# Patient Record
Sex: FEMALE | Race: WHITE | NOT HISPANIC OR LATINO | Employment: FULL TIME | URBAN - METROPOLITAN AREA
[De-identification: names, ages, dates, MRNs, and addresses within clinical notes are randomized per-mention and may not be internally consistent; named-entity substitution may affect disease eponyms.]

---

## 2018-10-16 ENCOUNTER — LAB (OUTPATIENT)
Dept: LAB | Facility: HOSPITAL | Age: 33
End: 2018-10-16

## 2018-10-16 ENCOUNTER — TRANSCRIBE ORDERS (OUTPATIENT)
Dept: LAB | Facility: HOSPITAL | Age: 33
End: 2018-10-16

## 2018-10-16 DIAGNOSIS — Z11.3 SCREENING EXAMINATION FOR VENEREAL DISEASE: Primary | ICD-10-CM

## 2018-10-16 DIAGNOSIS — Z11.3 SCREENING EXAMINATION FOR VENEREAL DISEASE: ICD-10-CM

## 2018-10-16 LAB
HBV SURFACE AG SERPL QL IA: NORMAL
HCV AB SER DONR QL: NORMAL
HIV1+2 AB SER QL: NORMAL

## 2018-10-16 PROCEDURE — 36415 COLL VENOUS BLD VENIPUNCTURE: CPT

## 2018-10-16 PROCEDURE — 86592 SYPHILIS TEST NON-TREP QUAL: CPT

## 2018-10-16 PROCEDURE — G0432 EIA HIV-1/HIV-2 SCREEN: HCPCS | Performed by: PHYSICIAN ASSISTANT

## 2018-10-16 PROCEDURE — 87340 HEPATITIS B SURFACE AG IA: CPT

## 2018-10-16 PROCEDURE — 86803 HEPATITIS C AB TEST: CPT | Performed by: PHYSICIAN ASSISTANT

## 2018-10-17 LAB — RPR SER QL: NORMAL

## 2018-11-14 ENCOUNTER — TRANSCRIBE ORDERS (OUTPATIENT)
Dept: ADMINISTRATIVE | Facility: HOSPITAL | Age: 33
End: 2018-11-14

## 2018-11-14 DIAGNOSIS — E04.1 THYROID NODULE: Primary | ICD-10-CM

## 2018-11-26 ENCOUNTER — HOSPITAL ENCOUNTER (OUTPATIENT)
Dept: ULTRASOUND IMAGING | Facility: HOSPITAL | Age: 33
Discharge: HOME | End: 2018-11-26
Attending: FAMILY MEDICINE | Admitting: FAMILY MEDICINE

## 2018-11-26 DIAGNOSIS — E04.1 THYROID NODULE: ICD-10-CM

## 2018-11-26 PROCEDURE — 76536 US EXAM OF HEAD AND NECK: CPT

## 2018-11-29 ENCOUNTER — TRANSCRIBE ORDERS (OUTPATIENT)
Dept: ADMINISTRATIVE | Facility: HOSPITAL | Age: 33
End: 2018-11-29

## 2018-11-29 ENCOUNTER — HOSPITAL ENCOUNTER (EMERGENCY)
Facility: HOSPITAL | Age: 33
End: 2018-11-29

## 2018-11-29 DIAGNOSIS — E04.1 SOLITARY THYROID NODULE: Primary | ICD-10-CM

## 2018-12-14 ENCOUNTER — HOSPITAL ENCOUNTER (OUTPATIENT)
Dept: ULTRASOUND IMAGING | Facility: HOSPITAL | Age: 33
Discharge: HOME | End: 2018-12-14
Attending: FAMILY MEDICINE | Admitting: RADIOLOGY

## 2018-12-14 DIAGNOSIS — E04.1 SOLITARY THYROID NODULE: ICD-10-CM

## 2018-12-14 PROCEDURE — 76942 ECHO GUIDE FOR BIOPSY: CPT

## 2018-12-14 PROCEDURE — 88173 CYTOPATH EVAL FNA REPORT: CPT | Performed by: FAMILY MEDICINE

## 2018-12-14 PROCEDURE — 88305 TISSUE EXAM BY PATHOLOGIST: CPT | Performed by: FAMILY MEDICINE

## 2018-12-14 RX ORDER — LIDOCAINE HYDROCHLORIDE 10 MG/ML
10 INJECTION, SOLUTION EPIDURAL; INFILTRATION; INTRACAUDAL; PERINEURAL ONCE
Status: COMPLETED | OUTPATIENT
Start: 2018-12-14 | End: 2018-12-14

## 2018-12-14 RX ADMIN — LIDOCAINE HYDROCHLORIDE 5 ML: 10 INJECTION, SOLUTION EPIDURAL; INFILTRATION; INTRACAUDAL; PERINEURAL at 16:07

## 2018-12-14 NOTE — POST-PROCEDURE NOTE
Radiology Procedure    Pre-procedure: Informed written consent was obtained.  We discussed the procedure and major risks in detail.  Ms. Urrutia agrees to proceed with ultrasound guided FNA of left thyroid nodule.                 Procedure Performed: Ultrasound guided 25 gauge FNA x 3 of left thyroid nodule     IV Sedation and/or Anesthesia:  No    Complications: None.    Preliminary Findings: Dark bloody aspirate x 3 used to make 3 pairs of slides, remainder combined in cytology fixative    Specimen Removed: As above    Estimated Blood Loss:  0ml/sample only    Post-Procedure Diagnosis: Left thyroid nodule.    Post-Procedure Plan: She has no c/o.  She will f/u with Dr. Lobo for results.    Standard Discharge Instructions Given:yes     Royce Perez MD  12/14/18  4:46 PM

## 2018-12-19 LAB
LAB AP CASE REPORT: NORMAL
PATH REPORT.FINAL DX SPEC: NORMAL

## 2019-06-24 ENCOUNTER — TRANSCRIBE ORDERS (OUTPATIENT)
Dept: ADMINISTRATIVE | Facility: HOSPITAL | Age: 34
End: 2019-06-24

## 2019-06-24 DIAGNOSIS — E04.1 THYROID NODULE: Primary | ICD-10-CM

## 2019-06-28 ENCOUNTER — HOSPITAL ENCOUNTER (OUTPATIENT)
Dept: ULTRASOUND IMAGING | Facility: HOSPITAL | Age: 34
Discharge: HOME | End: 2019-06-28
Admitting: FAMILY MEDICINE

## 2019-06-28 DIAGNOSIS — E04.1 THYROID NODULE: ICD-10-CM

## 2019-06-28 PROCEDURE — 76536 US EXAM OF HEAD AND NECK: CPT

## 2020-06-26 ENCOUNTER — TRANSCRIBE ORDERS (OUTPATIENT)
Dept: ADMINISTRATIVE | Facility: HOSPITAL | Age: 35
End: 2020-06-26

## 2020-06-26 DIAGNOSIS — E04.1 LEFT THYROID NODULE: Primary | ICD-10-CM

## 2020-07-02 ENCOUNTER — HOSPITAL ENCOUNTER (OUTPATIENT)
Dept: ULTRASOUND IMAGING | Facility: HOSPITAL | Age: 35
Discharge: HOME | End: 2020-07-02
Admitting: FAMILY MEDICINE

## 2020-07-02 DIAGNOSIS — E04.1 LEFT THYROID NODULE: ICD-10-CM

## 2020-07-02 PROCEDURE — 76536 US EXAM OF HEAD AND NECK: CPT

## 2020-08-17 ENCOUNTER — TRANSCRIBE ORDERS (OUTPATIENT)
Dept: ADMINISTRATIVE | Facility: HOSPITAL | Age: 35
End: 2020-08-17

## 2020-08-17 ENCOUNTER — HOSPITAL ENCOUNTER (OUTPATIENT)
Dept: GENERAL RADIOLOGY | Facility: HOSPITAL | Age: 35
Discharge: HOME | End: 2020-08-17
Admitting: INTERNAL MEDICINE

## 2020-08-17 DIAGNOSIS — M25.561 ACUTE PAIN OF RIGHT KNEE: ICD-10-CM

## 2020-08-17 DIAGNOSIS — M25.561 ACUTE PAIN OF RIGHT KNEE: Primary | ICD-10-CM

## 2020-08-17 PROCEDURE — 73560 X-RAY EXAM OF KNEE 1 OR 2: CPT

## 2021-07-09 ENCOUNTER — TRANSCRIBE ORDERS (OUTPATIENT)
Dept: ADMINISTRATIVE | Facility: HOSPITAL | Age: 36
End: 2021-07-09

## 2021-07-09 DIAGNOSIS — E04.1 LEFT THYROID NODULE: Primary | ICD-10-CM

## 2021-07-21 ENCOUNTER — HOSPITAL ENCOUNTER (OUTPATIENT)
Dept: ULTRASOUND IMAGING | Facility: HOSPITAL | Age: 36
Discharge: HOME | End: 2021-07-21
Admitting: FAMILY MEDICINE

## 2021-07-21 DIAGNOSIS — E04.1 LEFT THYROID NODULE: ICD-10-CM

## 2021-07-21 PROCEDURE — 76536 US EXAM OF HEAD AND NECK: CPT

## 2023-02-06 ENCOUNTER — TRANSCRIBE ORDERS (OUTPATIENT)
Dept: ADMINISTRATIVE | Facility: HOSPITAL | Age: 38
End: 2023-02-06
Payer: COMMERCIAL

## 2023-02-06 DIAGNOSIS — E04.1 LEFT THYROID NODULE: Primary | ICD-10-CM

## 2024-02-12 ENCOUNTER — LAB (OUTPATIENT)
Dept: LAB | Facility: HOSPITAL | Age: 39
End: 2024-02-12
Payer: COMMERCIAL

## 2024-02-12 ENCOUNTER — TRANSCRIBE ORDERS (OUTPATIENT)
Dept: LAB | Facility: HOSPITAL | Age: 39
End: 2024-02-12
Payer: COMMERCIAL

## 2024-02-12 DIAGNOSIS — E28.2 POLYCYSTIC OVARIES: ICD-10-CM

## 2024-02-12 DIAGNOSIS — E28.2 POLYCYSTIC OVARIES: Primary | ICD-10-CM

## 2024-02-12 LAB
FSH SERPL-ACNC: 4.08 MIU/ML
LH SERPL-ACNC: 5.21 MIU/ML
TSH SERPL DL<=0.05 MIU/L-ACNC: 3.14 UIU/ML (ref 0.27–4.2)

## 2024-02-12 PROCEDURE — 84443 ASSAY THYROID STIM HORMONE: CPT

## 2024-02-12 PROCEDURE — 82627 DEHYDROEPIANDROSTERONE: CPT

## 2024-02-12 PROCEDURE — 83498 ASY HYDROXYPROGESTERONE 17-D: CPT

## 2024-02-12 PROCEDURE — 83001 ASSAY OF GONADOTROPIN (FSH): CPT

## 2024-02-12 PROCEDURE — 36415 COLL VENOUS BLD VENIPUNCTURE: CPT

## 2024-02-12 PROCEDURE — 84402 ASSAY OF FREE TESTOSTERONE: CPT

## 2024-02-12 PROCEDURE — 83002 ASSAY OF GONADOTROPIN (LH): CPT

## 2024-02-13 LAB — DHEA-S SERPL-MCNC: 151 UG/DL (ref 57.3–279.2)

## 2024-02-14 ENCOUNTER — TRANSCRIBE ORDERS (OUTPATIENT)
Dept: ADMINISTRATIVE | Facility: HOSPITAL | Age: 39
End: 2024-02-14
Payer: COMMERCIAL

## 2024-02-14 DIAGNOSIS — E04.1 LEFT THYROID NODULE: Primary | ICD-10-CM

## 2024-02-16 LAB — 17OHP SERPL-MCNC: 29 NG/DL

## 2024-02-20 LAB — TESTOST FREE SERPL-MCNC: 0.5 PG/ML (ref 0–4.2)

## 2024-03-04 ENCOUNTER — HOSPITAL ENCOUNTER (OUTPATIENT)
Dept: ULTRASOUND IMAGING | Facility: HOSPITAL | Age: 39
Discharge: HOME | End: 2024-03-04
Admitting: CLINIC/CENTER
Payer: COMMERCIAL

## 2024-03-04 DIAGNOSIS — E04.1 LEFT THYROID NODULE: ICD-10-CM

## 2024-03-04 PROCEDURE — 76536 US EXAM OF HEAD AND NECK: CPT

## 2024-03-21 ENCOUNTER — OFFICE VISIT (OUTPATIENT)
Dept: ENDOCRINOLOGY | Facility: CLINIC | Age: 39
End: 2024-03-21
Payer: COMMERCIAL

## 2024-03-21 VITALS
HEIGHT: 64 IN | HEART RATE: 83 BPM | WEIGHT: 160 LBS | OXYGEN SATURATION: 99 % | SYSTOLIC BLOOD PRESSURE: 114 MMHG | BODY MASS INDEX: 27.31 KG/M2 | DIASTOLIC BLOOD PRESSURE: 74 MMHG

## 2024-03-21 DIAGNOSIS — E04.2 MULTIPLE THYROID NODULES: Primary | ICD-10-CM

## 2024-03-21 PROBLEM — E04.1 THYROID NODULE: Status: ACTIVE | Noted: 2024-03-21

## 2024-03-21 PROCEDURE — 99203 OFFICE O/P NEW LOW 30 MIN: CPT | Performed by: INTERNAL MEDICINE

## 2024-03-21 RX ORDER — CETIRIZINE HYDROCHLORIDE 10 MG/1
10 TABLET ORAL DAILY
COMMUNITY

## 2024-03-21 RX ORDER — MULTIPLE VITAMINS W/ MINERALS TAB 9MG-400MCG
1 TAB ORAL DAILY
COMMUNITY

## 2024-03-21 RX ORDER — PAROXETINE HYDROCHLORIDE 20 MG/1
TABLET, FILM COATED ORAL
COMMUNITY

## 2024-03-21 RX ORDER — DROSPIRENONE 4 MG/1
1 TABLET, FILM COATED ORAL DAILY
COMMUNITY
Start: 2024-03-19 | End: 2024-06-17

## 2024-03-21 NOTE — ASSESSMENT & PLAN NOTE
Continue statin     She has been euthyroid.  She has multiple thyroid nodules.  One in the right lobe was quite small and stable.  The larger left nodule has increased in size due to accumulation of cyst fluid.  Prior FNA of this nodule was benign.  We discussed the low likelihood of malignancy.  We discussed treatment options including observation vs FNA with drainage vs ablation (but I don't know of anyone in the area doing this) vs surgery.    She preferred to do FNA with drainage.  Will schedule this.

## 2024-03-21 NOTE — PROGRESS NOTES
Office Note      Date: 2024  Patient Name: Candace Urrutia  MRN: [id number]  : 1985    Chief Complaint   Patient presents with    Thyroid Problem       History of Present Illness:   Candace Urrutia is a 38 y.o. female who presents for Thyroid Problem    She has h/o thyroid nodules.  She had FNA done of left nodule in 2018 that was benign.  This nodule has accumulated cyst fluid over time.  The nodule was 2.4cm in 2018.  The most recent u/s from earlier this month showed the nodule was 3.4cm and was predominantly cystic.  She had labs done last month.  The TSH was normal at 3.14.  She denies any h/o hypo- or hyperthyroidism.  She hasn't taken any thyroid meds.  She notes some fatigue.  She notes constipation and diarrhea.  She notes cold and heat intolerance.  She notes anxiety and depression.  She notes rare trouble swallowing.  She notes some pressure in her neck when lying supine.  She says the nodule is visible to her now.    Subjective      Patient was born where: [place].  Facial radiation exposure: No.  High iodine intake: No  Family hx of thyroid disease: Yes, describe: mother.    Review of Systems:   Review of Systems   Constitutional:  Positive for activity change, appetite change, fatigue and unexpected weight change.   HENT: Negative.     Eyes: Negative.    Respiratory: Negative.     Cardiovascular: Negative.    Gastrointestinal:  Positive for constipation and diarrhea.   Endocrine: Positive for cold intolerance, heat intolerance and polydipsia.   Genitourinary:  Positive for dysuria, pelvic pain and urgency.   Musculoskeletal: Negative.    Skin: Negative.    Allergic/Immunologic: Positive for environmental allergies.   Neurological: Negative.    Hematological:  Bruises/bleeds easily.   Psychiatric/Behavioral:  Positive for decreased concentration. The patient is nervous/anxious.        The following portions of the patient's history were reviewed and updated as  "appropriate: allergies, current medications, past family history, past medical history, past social history, past surgical history, and problem list.    Objective     Visit Vitals  /74   Pulse 83   Ht 162.6 cm (64\")   Wt 72.6 kg (160 lb)   SpO2 99%   BMI 27.46 kg/m²       Physical Exam:  Physical Exam  Constitutional:       Appearance: Normal appearance.   HENT:      Head: Normocephalic and atraumatic.   Eyes:      Extraocular Movements: Extraocular movements intact.      Conjunctiva/sclera: Conjunctivae normal.   Neck:      Thyroid: Thyroid mass present. No thyromegaly or thyroid tenderness.      Comments: 3cm left thyroid nodule - freely movable  Cardiovascular:      Rate and Rhythm: Normal rate and regular rhythm.      Pulses: Normal pulses.      Heart sounds: Normal heart sounds.   Pulmonary:      Effort: Pulmonary effort is normal.      Breath sounds: Normal breath sounds.   Abdominal:      General: Bowel sounds are normal.      Palpations: Abdomen is soft.   Musculoskeletal:         General: Normal range of motion.      Cervical back: Normal range of motion and neck supple.   Lymphadenopathy:      Cervical: No cervical adenopathy.   Skin:     General: Skin is warm and dry.   Neurological:      Mental Status: She is alert.   Psychiatric:         Mood and Affect: Mood normal.         Behavior: Behavior normal.         Thought Content: Thought content normal.         Judgment: Judgment normal.         Labs:    TSH  No results found for: \"TSHBASE\"     Free T4  No results found for: \"FREET4\"    T3  No results found for: \"C7JTFUM\"      TPO  No results found for: \"THYROIDAB\"    TG AB  No results found for: \"THGAB\"    TG  No results found for: \"THYROGLB\"    CBC w/DIFF  No results found for: \"WBC\", \"RBC\", \"HGB\", \"HCT\", \"MCV\", \"MCH\", \"MCHC\", \"RDW\", \"RDWSD\", \"MPV\", \"PLT\", \"NEUTRORELPCT\", \"LYMPHORELPCT\", \"MONORELPCT\", \"EOSRELPCT\", \"BASORELPCT\", \"AUTOIGPER\", \"NEUTROABS\", \"LYMPHSABS\", \"MONOSABS\", \"EOSABS\", \"BASOSABS\", " "\"AUTOIGNUM\", \"NRBC\"        Assessment / Plan      Assessment & Plan:  Diagnoses and all orders for this visit:    1. Multiple thyroid nodules (Primary)  Assessment & Plan:  She has been euthyroid.  She has multiple thyroid nodules.  One in the right lobe was quite small and stable.  The larger left nodule has increased in size due to accumulation of cyst fluid.  Prior FNA of this nodule was benign.  We discussed the low likelihood of malignancy.  We discussed treatment options including observation vs FNA with drainage vs ablation (but I don't know of anyone in the area doing this) vs surgery.    She preferred to do FNA with drainage.  Will schedule this.             Current Outpatient Medications   Medication Instructions    cetirizine (ZYRTEC) 10 mg, Oral, Daily    Drospirenone (Slynd) 4 MG tablet 1 tablet, Oral, Daily    multivitamin with minerals tablet tablet 1 tablet, Oral, Daily    PARoxetine (PAXIL) 20 MG tablet       Return in about 6 months (around 9/21/2024) for Recheck with TSH.    Electronically signed by: Kit Gutierres MD  03/21/2024  "

## 2024-03-25 ENCOUNTER — OFFICE VISIT (OUTPATIENT)
Dept: ENDOCRINOLOGY | Facility: CLINIC | Age: 39
End: 2024-03-25
Payer: COMMERCIAL

## 2024-03-25 VITALS
DIASTOLIC BLOOD PRESSURE: 68 MMHG | OXYGEN SATURATION: 98 % | BODY MASS INDEX: 26.72 KG/M2 | HEART RATE: 60 BPM | SYSTOLIC BLOOD PRESSURE: 114 MMHG | HEIGHT: 64 IN | WEIGHT: 156.5 LBS

## 2024-03-25 DIAGNOSIS — E04.2 MULTIPLE THYROID NODULES: Primary | ICD-10-CM

## 2024-03-25 PROCEDURE — 10005 FNA BX W/US GDN 1ST LES: CPT | Performed by: INTERNAL MEDICINE

## 2024-03-25 NOTE — PROGRESS NOTES
"     Office Note      Date: 2024  Patient Name: Candace Urrutia  MRN: [id number]  : 1985    Chief Complaint   Patient presents with    Thyroid Problem     Fluid drainage       History of Present Illness:   Candace Urrutia is a 38 y.o. female who presents for Thyroid Problem (Fluid drainage)    She has h/o left thyroid cyst.  This has accumulated more fluid and we discussed FNA with drainage.  She returns for FNA today.    Subjective      Review of Systems:   Review of Systems    The following portions of the patient's history were reviewed and updated as appropriate: allergies, current medications, past family history, past medical history, past social history, past surgical history, and problem list.    Objective     Visit Vitals  /68 (BP Location: Left arm, Patient Position: Sitting, Cuff Size: Adult)   Pulse 60   Ht 162.6 cm (64\")   Wt 71 kg (156 lb 8 oz)   SpO2 98%   BMI 26.86 kg/m²       Physical Exam:  Physical Exam    Labs:    TSH  No results found for: \"TSHBASE\"     Free T4  No results found for: \"FREET4\"    T3  No results found for: \"[id number]\"      TPO  No results found for: \"THYROIDAB\"    TG AB  No results found for: \"THGAB\"    TG  No results found for: \"THYROGLB\"    CBC w/DIFF  No results found for: \"WBC\", \"RBC\", \"HGB\", \"HCT\", \"MCV\", \"MCH\", \"MCHC\", \"RDW\", \"RDWSD\", \"MPV\", \"PLT\", \"NEUTRORELPCT\", \"LYMPHORELPCT\", \"MONORELPCT\", \"EOSRELPCT\", \"BASORELPCT\", \"AUTOIGPER\", \"NEUTROABS\", \"LYMPHSABS\", \"MONOSABS\", \"EOSABS\", \"BASOSABS\", \"AUTOIGNUM\", \"NRBC\"        Assessment / Plan      Assessment & Plan:  Diagnoses and all orders for this visit:    1. Multiple thyroid nodules (Primary)  Assessment & Plan:  She returns for FNA of left thyroid cyst.    Informed consent was obtained.  The neck was prepped with alcohol.  The u/s was used for guidance.  Three passes were made into the left cystic nodule.  Approximately 4mL total of reddish-brown cyst fluid was removed.  She tolerated the " procedure well without obvious complication.  Will contact her with pathology results when available.    Orders:  -     US Fine Needle Aspiration BX 1st Lesion; Future      Current Outpatient Medications   Medication Instructions    cetirizine (ZYRTEC) 10 mg, Oral, Daily    Drospirenone (Slynd) 4 MG tablet 1 tablet, Oral, Daily    multivitamin with minerals tablet tablet 1 tablet, Oral, Daily    PARoxetine (PAXIL) 20 MG tablet       Return for Next scheduled follow up, Recheck with TSH.    Electronically signed by: Kit Gutierres MD  03/25/2024

## 2024-03-25 NOTE — ASSESSMENT & PLAN NOTE
She returns for FNA of left thyroid cyst.    Informed consent was obtained.  The neck was prepped with alcohol.  The u/s was used for guidance.  Three passes were made into the left cystic nodule.  Approximately 4mL total of reddish-brown cyst fluid was removed.  She tolerated the procedure well without obvious complication.  Will contact her with pathology results when available.

## 2024-03-27 LAB — REF LAB TEST METHOD: NORMAL

## 2024-10-04 ENCOUNTER — OFFICE VISIT (OUTPATIENT)
Dept: ENDOCRINOLOGY | Facility: CLINIC | Age: 39
End: 2024-10-04
Payer: COMMERCIAL

## 2024-10-04 VITALS
OXYGEN SATURATION: 99 % | DIASTOLIC BLOOD PRESSURE: 70 MMHG | HEART RATE: 65 BPM | SYSTOLIC BLOOD PRESSURE: 110 MMHG | WEIGHT: 156 LBS | HEIGHT: 64 IN | BODY MASS INDEX: 26.63 KG/M2

## 2024-10-04 DIAGNOSIS — E04.2 MULTIPLE THYROID NODULES: Primary | ICD-10-CM

## 2024-10-04 PROCEDURE — 99213 OFFICE O/P EST LOW 20 MIN: CPT | Performed by: INTERNAL MEDICINE

## 2024-10-04 PROCEDURE — 84443 ASSAY THYROID STIM HORMONE: CPT | Performed by: INTERNAL MEDICINE

## 2024-10-04 PROCEDURE — 36415 COLL VENOUS BLD VENIPUNCTURE: CPT | Performed by: INTERNAL MEDICINE

## 2024-10-04 RX ORDER — DROSPIRENONE 4 MG/1
4 TABLET, FILM COATED ORAL DAILY
COMMUNITY
Start: 2024-01-15

## 2024-10-04 NOTE — PROGRESS NOTES
"     Office Note      Date: 10/04/2024  Patient Name: Candace Urrutia  MRN: [id number]  : 1985    Chief Complaint   Patient presents with    Thyroid Problem     Nodules       History of Present Illness:   Candace Urrutia is a 39 y.o. female who presents for Thyroid Problem (Nodules)    She has h/o thyroid nodules. She had FNA done of left nodule in 2018 that was benign. This nodule has accumulated cyst fluid over time. The nodule was 2.4cm in 2018. The cyst had increased in size and we did FNA with drainage 3/2024. She denies any h/o hypo- or hyperthyroidism. She hasn't taken any thyroid meds. She notes some fatigue. She notes cold and heat intolerance. She notes anxiety and depression. She denies trouble swallowing. She notes that the pressure in her neck when lying supine has resolved after last FNA.     Subjective      Review of Systems:   Review of Systems   Constitutional:  Positive for fatigue.   Cardiovascular: Negative.    Gastrointestinal: Negative.    Endocrine: Positive for cold intolerance and heat intolerance.       The following portions of the patient's history were reviewed and updated as appropriate: allergies, current medications, past family history, past medical history, past social history, past surgical history, and problem list.    Objective     Visit Vitals  /70 (BP Location: Left arm, Patient Position: Sitting, Cuff Size: Adult)   Pulse 65   Ht 162.6 cm (64\")   Wt 70.8 kg (156 lb)   SpO2 99%   BMI 26.78 kg/m²       Physical Exam:  Physical Exam  Constitutional:       Appearance: Normal appearance.   Neck:      Thyroid: Thyroid mass present. No thyromegaly or thyroid tenderness.      Comments: 3cm nodule in left thyroid lobe - freely movable  Lymphadenopathy:      Cervical: No cervical adenopathy.   Neurological:      Mental Status: She is alert.       Labs:    TSH  No results found for: \"TSHBASE\"     Free T4  No results found for: \"FREET4\"    T3  No results " "found for: \"[id number]\"      TPO  No results found for: \"THYROIDAB\"    TG AB  No results found for: \"THGAB\"    TG  No results found for: \"THYROGLB\"    CBC w/DIFF  No results found for: \"WBC\", \"RBC\", \"HGB\", \"HCT\", \"MCV\", \"MCH\", \"MCHC\", \"RDW\", \"RDWSD\", \"MPV\", \"PLT\", \"NEUTRORELPCT\", \"LYMPHORELPCT\", \"MONORELPCT\", \"EOSRELPCT\", \"BASORELPCT\", \"AUTOIGPER\", \"NEUTROABS\", \"LYMPHSABS\", \"MONOSABS\", \"EOSABS\", \"BASOSABS\", \"AUTOIGNUM\", \"NRBC\"        Assessment / Plan      Assessment & Plan:  Diagnoses and all orders for this visit:    1. Multiple thyroid nodules (Primary)  Assessment & Plan:  Symptomatically better after last FNA with cyst aspiration.  Thyroid nodule still palpable.  Check TSH today.        Current Outpatient Medications   Medication Instructions    cetirizine (ZYRTEC) 10 mg, Oral, Daily    multivitamin with minerals tablet tablet 1 tablet, Oral, Daily    PARoxetine (PAXIL) 20 MG tablet     Slynd 4 mg, Oral, Daily      Return in about 6 months (around 4/4/2025) for Recheck with TSH, neck u/s.    Electronically signed by: Kit Gutierres MD  10/04/2024  "

## 2024-10-04 NOTE — ASSESSMENT & PLAN NOTE
Symptomatically better after last FNA with cyst aspiration.  Thyroid nodule still palpable.  Check TSH today.

## 2024-10-05 LAB — TSH SERPL DL<=0.05 MIU/L-ACNC: 2.41 UIU/ML (ref 0.27–4.2)

## 2025-02-04 ENCOUNTER — TELEPHONE (OUTPATIENT)
Dept: ENDOCRINOLOGY | Facility: CLINIC | Age: 40
End: 2025-02-04
Payer: COMMERCIAL

## 2025-02-04 NOTE — TELEPHONE ENCOUNTER
Caller: Candace Urrutia    Relationship: Self    Best call back number:   Telephone Information:   Mobile [phone]         What is the best time to reach you: ANYTIME    Who are you requesting to speak with (clinical staff, provider,  specific staff member): CLINICAL STAFF / PROVIDER     What was the call regarding: PT CALLED STATING SHE IS HAVING TROUBLE SWALLOWING AND IS NEEDING A SOONER APPT. PLEASE ADVISE.

## 2025-02-10 ENCOUNTER — OFFICE VISIT (OUTPATIENT)
Dept: ENDOCRINOLOGY | Facility: CLINIC | Age: 40
End: 2025-02-10
Payer: COMMERCIAL

## 2025-02-10 VITALS
DIASTOLIC BLOOD PRESSURE: 72 MMHG | OXYGEN SATURATION: 95 % | WEIGHT: 162 LBS | HEIGHT: 64 IN | SYSTOLIC BLOOD PRESSURE: 120 MMHG | BODY MASS INDEX: 27.66 KG/M2 | HEART RATE: 79 BPM

## 2025-02-10 DIAGNOSIS — E04.2 NONTOXIC MULTINODULAR GOITER: Primary | ICD-10-CM

## 2025-02-10 PROCEDURE — 99213 OFFICE O/P EST LOW 20 MIN: CPT | Performed by: INTERNAL MEDICINE

## 2025-02-10 PROCEDURE — 76536 US EXAM OF HEAD AND NECK: CPT | Performed by: INTERNAL MEDICINE

## 2025-02-10 NOTE — PROGRESS NOTES
"Chief Complaint   Patient presents with    multinodular goiter     Follow-up       HPI:   Candace Urrutia is a 39 y.o.female who [facility] to [facility] for f/u evaluation of her multinodular goiter. Last clinic visit was in March 25, 2024 with colleague Dr. Kit Gutierres.  Dr. Anaya was not available today for follow-up visit; therefore, patient was scheduled with me. Her history is as follows:    Interim Events:   -Patient presents today concerned that her goiter has increased in size.  Also reports difficulty swallowing, notes discomfort after swallowing her food in the esophageal area.     1) nontoxic multinodular goiter:  - Patient was initially found to have a goiter on physical exam in 2018  She has had serial thyroid Ultrasounds: [facility] Thyroid US (11/2018), (06/2019), (07/2020), (07/2021), (03/2024)  S/P FNA of left nodule in 2018 by radiology that showed benign cytology, S/P FNA of Left nodules by Dr. Gutierres and findings were consistent with thyroid cyst.     FMH: no known thyroid cancer, no known thyroid disease  PMH: no h/o irradiation of head, neck, or chest    Review of Systems   Constitutional: Negative.    HENT:  Positive for trouble swallowing.    Eyes: Negative.    Respiratory: Negative.     Cardiovascular: Negative.    Gastrointestinal: Negative.    Endocrine: Negative.    Genitourinary: Negative.    Musculoskeletal: Negative.    Skin: Negative.    Allergic/Immunologic: Negative.    Neurological: Negative.    Hematological: Negative.    Psychiatric/Behavioral: Negative.       The following portions of the patient's history were reviewed and updated as appropriate: allergies, current medications, past family history, past medical history, past social history, past surgical history and problem list.    /72 (BP Location: Right arm, Patient Position: Sitting, Cuff Size: Adult)   Pulse 79   Ht 162.6 cm (64\")   Wt 73.5 kg (162 lb)   SpO2 95%   BMI 27.81 kg/m²   Physical " Exam  Vitals reviewed.   Constitutional:       General: She is not in acute distress.     Appearance: She is well-developed. She is not diaphoretic.   HENT:      Head: Normocephalic.   Eyes:      Conjunctiva/sclera: Conjunctivae normal.      Pupils: Pupils are equal, round, and reactive to light.   Neck:      Thyroid: Thyromegaly (mild, right lobe normal, left lobe mildly enlarged) present.      Trachea: No tracheal deviation.      Comments: No discreet palpable nodule in the left lobe; however, the left lobe is mildly enlarged.     Cardiovascular:      Rate and Rhythm: Normal rate and regular rhythm.      Heart sounds: Normal heart sounds. No murmur heard.  Pulmonary:      Effort: Pulmonary effort is normal. No respiratory distress.      Breath sounds: Normal breath sounds.   Lymphadenopathy:      Cervical: No cervical adenopathy.   Skin:     General: Skin is warm and dry.      Findings: No erythema.   Neurological:      Mental Status: She is alert and oriented to person, place, and time.      Cranial Nerves: No cranial nerve deficit.   Psychiatric:         Behavior: Behavior normal.       LABS/IMAGING:   Lab Results   Component Value Date    TSH 2.410 10/04/2024       PROCEDURES (in office):  Thyroid Ultrasound Report  [facility]  [place], [place]    Date: 02/10/2025  Indication: non-toxic multinodular goiter  Comparison Imaging:  [facility] Thyroid US (11/2018), (06/2019), (07/2020), (07/2021), (03/2024)  Clinical History: 39 y.o. female with h/o MNG and normal thyroid function. S/P FNA of left nodule in 2018 by radiology that showed benign cytology, S/P FNA of Left nodules by Dr. Gutierres and findings were consistent with thyroid cyst.     Real time high resolution imaging of the thyroid gland was performed in transverse and longitudinal planes.  The right lobe measured 5.85 cm in Length x 1.77 cm in AP diameter x 1.85 cm in TV dimension.  The isthmus measured 0.20 cm in thickness.  The left thyroid lobe  measured 5.40 cm in length x 1.96 cm in AP diameter x 2.26 cm in TV dimension.    The thyroid gland appeared overall isoechoic with mostly homogenous echotexture.     No discrete solid nodules were identified in the right lobe or isthmus.  A subcentimeter (0.25 cm) thyroid cyst was seen in the right mid to superior lobe.    In the left superior lobe, a 0.45 (L) x 0.41 (AP) x 0.46 (T) cm isoechoic, solid nodule was identified.  The nodule had a smooth margin with an anechoic rim consistent with halo sign.  The nodule had no vascularity and no microcalcifications. (TI-RADS level 3).    In the left mid to inferior lobe, a 2.91(L) x 1.88(AP) x 1.95(T) cm isoechoic, mixed cystic and solid nodule was again identified.  The nodule had a smooth margin, minimal peripheral vascularity, and no microcalcifications.  (TI-RADS level 2).    No pathologic lymph nodes were seen.     IMPRESSION:   1) asymmetric, mildly enlarged thyroid gland with the right lobe and isthmus normal in size in the left lobe slightly enlarged.  Is consistent with multinodular goiter.  2) No discrete solid nodules were identified in the right lobe or isthmus.  A subcentimeter (0.25 cm) thyroid cyst was seen in the right mid to superior lobe.  3) In the left superior lobe, a 0.45 (L) x 0.41 (AP) x 0.46 (T) cm isoechoic, solid nodule was identified.  The nodule had a smooth margin with an anechoic rim consistent with halo sign.  The nodule had no vascularity and no microcalcifications. (TI-RADS level 3).  4) In the left mid to inferior lobe, a 2.91(L) x 1.88(AP) x 1.95(T) cm isoechoic, mixed cystic and solid nodule was again identified.  The nodule had a smooth margin, minimal peripheral vascularity, and no microcalcifications.  (TI-RADS level 2).  5) the small, superior left lobe nodule lacked suspicious ultrasound characteristics and did not meet criteria for FNA.  The left inferior nodule lacked suspicious ultrasound characteristics and did not criteria  for repeat FNA.    RECOMMENDATIONS: Repeat Thyroid US recommended if changes in the gland are noted on physical exam or if persistent cervical lymphadenopathy develops.     Electronically Signed: Jacqui Humphries MD      ASSESSMENT/PLAN:  1) nontoxic multinodular goiter: Thyroid US completed today showed -  - asymmetric, mildly enlarged thyroid gland with the right lobe and isthmus normal in size in the left lobe slightly enlarged.  Is consistent with multinodular goiter.  - No discrete solid nodules were identified in the right lobe or isthmus.  A subcentimeter (0.25 cm) thyroid cyst was seen in the right mid to superior lobe.  - In the left superior lobe, a 0.45 (L) x 0.41 (AP) x 0.46 (T) cm isoechoic, solid nodule was identified.  The nodule had a smooth margin with an anechoic rim consistent with halo sign.  The nodule had no vascularity and no microcalcifications. (TI-RADS level 3).  - In the left mid to inferior lobe, a 2.91(L) x 1.88(AP) x 1.95(T) cm isoechoic, mixed cystic and solid nodule was again identified.  The nodule had a smooth margin, minimal peripheral vascularity, and no microcalcifications.  (TI-RADS level 2).  - the small, superior left lobe nodule lacked suspicious ultrasound characteristics and did not meet criteria for FNA.  The left inferior nodule lacked suspicious ultrasound characteristics and did not criteria for repeat FNA.    RECOMMENDATIONS: Repeat Thyroid US recommended if changes in the gland are noted on physical exam or if persistent cervical lymphadenopathy develops.     Reviewed US images with patient. Discussed the clinical significance of thyroid nodules, risk factors for thyroid cancer, indications for FNA. Discussed that the left lobe nodule is similar in appearance to prior imaging. Is S/P FNA in 2018, 03/2024 of the left lobe nodule by other providers that showed no malignant cells which is reassuring.     Discussed that the dysphagia symptoms she is experiencing appear to be  from her thyroid gland.     Pt to RTC with Dr. Gutierres as needed    Electronically Signed: Jacqui Humphries MD

## 2025-02-10 NOTE — PROGRESS NOTES
Thyroid Ultrasound Report  [facility]  [place], [place]    Date: 02/10/2025  Indication: non-toxic multinodular goiter  Comparison Imaging: [facility] Thyroid US (11/2018), (06/2019), (07/2020), (07/2021), (03/2024)  Clinical History: 39 y.o. female with h/o MNG and normal thyroid function. S/P FNA of left nodule in 2018 by radiology that showed benign cytology, S/P FNA of Left nodules by Dr. Gutierres and findings were consistent with thyroid cyst.     Real time high resolution imaging of the thyroid gland was performed in transverse and longitudinal planes.  The right lobe measured 5.85 cm in Length x 1.77 cm in AP diameter x 1.85 cm in TV dimension.  The isthmus measured 0.20 cm in thickness.  The left thyroid lobe measured 5.40 cm in length x 1.96 cm in AP diameter x 2.26 cm in TV dimension.    The thyroid gland appeared overall isoechoic with mostly homogenous echotexture.     No discrete solid nodules were identified in the right lobe or isthmus.  A subcentimeter (0.25 cm) thyroid cyst was seen in the right mid to superior lobe.    In the left superior lobe, a 0.45 (L) x 0.41 (AP) x 0.46 (T) cm isoechoic, solid nodule was identified.  The nodule had a smooth margin with an anechoic rim consistent with halo sign.  The nodule had no vascularity and no microcalcifications. (TI-RADS level 3).    In the left mid to inferior lobe, a 2.91(L) x 1.88(AP) x 1.95(T) cm isoechoic, mixed cystic and solid nodule was again identified.  The nodule had a smooth margin, minimal peripheral vascularity, and no microcalcifications.  (TI-RADS level 2).    No pathologic lymph nodes were seen.     IMPRESSION:   1) asymmetric, mildly enlarged thyroid gland with the right lobe and isthmus normal in size in the left lobe slightly enlarged.  Is consistent with multinodular goiter.  2) No discrete solid nodules were identified in the right lobe or isthmus.  A subcentimeter (0.25 cm) thyroid cyst was seen in the right mid to  superior lobe.  3) In the left superior lobe, a 0.45 (L) x 0.41 (AP) x 0.46 (T) cm isoechoic, solid nodule was identified.  The nodule had a smooth margin with an anechoic rim consistent with halo sign.  The nodule had no vascularity and no microcalcifications. (TI-RADS level 3).  4) In the left mid to inferior lobe, a 2.91(L) x 1.88(AP) x 1.95(T) cm isoechoic, mixed cystic and solid nodule was again identified.  The nodule had a smooth margin, minimal peripheral vascularity, and no microcalcifications.  (TI-RADS level 2).  5) the small, superior left lobe nodule lacked suspicious ultrasound characteristics and did not meet criteria for FNA.  The left inferior nodule lacked suspicious ultrasound characteristics and did not criteria for repeat FNA.    RECOMMENDATIONS: Repeat Thyroid US recommended if changes in the gland are noted on physical exam or if persistent cervical lymphadenopathy develops.     Electronically Signed: Jacqui Humphries MD

## 2025-02-23 ENCOUNTER — APPOINTMENT (OUTPATIENT)
Dept: ULTRASOUND IMAGING | Facility: HOSPITAL | Age: 40
End: 2025-02-23
Payer: COMMERCIAL

## 2025-02-23 ENCOUNTER — ANCILLARY PROCEDURE (OUTPATIENT)
Dept: EMERGENCY DEPT | Facility: HOSPITAL | Age: 40
End: 2025-02-23
Payer: COMMERCIAL

## 2025-02-23 ENCOUNTER — HOSPITAL ENCOUNTER (INPATIENT)
Facility: HOSPITAL | Age: 40
LOS: 2 days | Discharge: HOME | End: 2025-02-25
Attending: EMERGENCY MEDICINE | Admitting: INTERNAL MEDICINE
Payer: COMMERCIAL

## 2025-02-23 DIAGNOSIS — D72.829 LEUKOCYTOSIS, UNSPECIFIED TYPE: ICD-10-CM

## 2025-02-23 DIAGNOSIS — K85.90 ACUTE PANCREATITIS WITHOUT INFECTION OR NECROSIS, UNSPECIFIED PANCREATITIS TYPE: Primary | ICD-10-CM

## 2025-02-23 LAB
ALBUMIN SERPL-MCNC: 4.4 G/DL (ref 3.5–5.2)
ALBUMIN/GLOB SERPL: 1.3 G/DL
ALP SERPL-CCNC: 69 U/L (ref 39–117)
ALT SERPL W P-5'-P-CCNC: 12 U/L (ref 1–33)
ANION GAP SERPL CALCULATED.3IONS-SCNC: 10 MMOL/L (ref 5–15)
AST SERPL-CCNC: 19 U/L (ref 1–32)
BACTERIA UR QL AUTO: ABNORMAL /HPF
BASOPHILS # BLD AUTO: 0.03 10*3/MM3 (ref 0–0.2)
BASOPHILS NFR BLD AUTO: 0.2 % (ref 0–1.5)
BILIRUB SERPL-MCNC: 0.3 MG/DL (ref 0–1.2)
BILIRUB UR QL STRIP: NEGATIVE
BUN SERPL-MCNC: 8 MG/DL (ref 6–20)
BUN/CREAT SERPL: 12.7 (ref 7–25)
CALCIUM SPEC-SCNC: 9.5 MG/DL (ref 8.6–10.5)
CHLORIDE SERPL-SCNC: 100 MMOL/L (ref 98–107)
CLARITY UR: CLEAR
CO2 SERPL-SCNC: 27 MMOL/L (ref 22–29)
COLOR UR: YELLOW
CREAT SERPL-MCNC: 0.63 MG/DL (ref 0.57–1)
DEPRECATED RDW RBC AUTO: 40.4 FL (ref 37–54)
EGFRCR SERPLBLD CKD-EPI 2021: 115.9 ML/MIN/1.73
EOSINOPHIL # BLD AUTO: 0 10*3/MM3 (ref 0–0.4)
EOSINOPHIL NFR BLD AUTO: 0 % (ref 0.3–6.2)
ERYTHROCYTE [DISTWIDTH] IN BLOOD BY AUTOMATED COUNT: 12.2 % (ref 12.3–15.4)
GLOBULIN UR ELPH-MCNC: 3.3 GM/DL
GLUCOSE SERPL-MCNC: 103 MG/DL (ref 65–99)
GLUCOSE UR STRIP-MCNC: NEGATIVE MG/DL
HCT VFR BLD AUTO: 46.2 % (ref 34–46.6)
HGB BLD-MCNC: 15.3 G/DL (ref 12–15.9)
HGB UR QL STRIP.AUTO: NEGATIVE
HOLD SPECIMEN: NORMAL
HYALINE CASTS UR QL AUTO: ABNORMAL /LPF
IMM GRANULOCYTES # BLD AUTO: 0.05 10*3/MM3 (ref 0–0.05)
IMM GRANULOCYTES NFR BLD AUTO: 0.3 % (ref 0–0.5)
KETONES UR QL STRIP: ABNORMAL
LEUKOCYTE ESTERASE UR QL STRIP.AUTO: NEGATIVE
LIPASE SERPL-CCNC: >3000 U/L (ref 13–60)
LYMPHOCYTES # BLD AUTO: 1.32 10*3/MM3 (ref 0.7–3.1)
LYMPHOCYTES NFR BLD AUTO: 8.3 % (ref 19.6–45.3)
MCH RBC QN AUTO: 29.5 PG (ref 26.6–33)
MCHC RBC AUTO-ENTMCNC: 33.1 G/DL (ref 31.5–35.7)
MCV RBC AUTO: 89.2 FL (ref 79–97)
MONOCYTES # BLD AUTO: 0.37 10*3/MM3 (ref 0.1–0.9)
MONOCYTES NFR BLD AUTO: 2.3 % (ref 5–12)
NEUTROPHILS NFR BLD AUTO: 14.23 10*3/MM3 (ref 1.7–7)
NEUTROPHILS NFR BLD AUTO: 88.9 % (ref 42.7–76)
NITRITE UR QL STRIP: NEGATIVE
NRBC BLD AUTO-RTO: 0 /100 WBC (ref 0–0.2)
PH UR STRIP.AUTO: 7 [PH] (ref 5–8)
PLATELET # BLD AUTO: 373 10*3/MM3 (ref 140–450)
PMV BLD AUTO: 9.3 FL (ref 6–12)
POTASSIUM SERPL-SCNC: 4.1 MMOL/L (ref 3.5–5.2)
PROT SERPL-MCNC: 7.7 G/DL (ref 6–8.5)
PROT UR QL STRIP: ABNORMAL
QT INTERVAL: 378 MS
QTC INTERVAL: 427 MS
RBC # BLD AUTO: 5.18 10*6/MM3 (ref 3.77–5.28)
RBC # UR STRIP: ABNORMAL /HPF
REF LAB TEST METHOD: ABNORMAL
SODIUM SERPL-SCNC: 137 MMOL/L (ref 136–145)
SP GR UR STRIP: 1.03 (ref 1–1.03)
SQUAMOUS #/AREA URNS HPF: ABNORMAL /HPF
TROPONIN T SERPL HS-MCNC: <6 NG/L
UROBILINOGEN UR QL STRIP: ABNORMAL
WBC # UR STRIP: ABNORMAL /HPF
WBC NRBC COR # BLD AUTO: 16 10*3/MM3 (ref 3.4–10.8)
WHOLE BLOOD HOLD COAG: NORMAL
WHOLE BLOOD HOLD SPECIMEN: NORMAL

## 2025-02-23 PROCEDURE — G0378 HOSPITAL OBSERVATION PER HR: HCPCS

## 2025-02-23 PROCEDURE — 93005 ELECTROCARDIOGRAM TRACING: CPT | Performed by: EMERGENCY MEDICINE

## 2025-02-23 PROCEDURE — 25010000002 PROCHLORPERAZINE 10 MG/2ML SOLUTION: Performed by: INTERNAL MEDICINE

## 2025-02-23 PROCEDURE — 76705 ECHO EXAM OF ABDOMEN: CPT

## 2025-02-23 PROCEDURE — 25810000003 LACTATED RINGERS PER 1000 ML: Performed by: NURSE PRACTITIONER

## 2025-02-23 PROCEDURE — 25010000002 ONDANSETRON PER 1 MG: Performed by: EMERGENCY MEDICINE

## 2025-02-23 PROCEDURE — 85025 COMPLETE CBC W/AUTO DIFF WBC: CPT | Performed by: EMERGENCY MEDICINE

## 2025-02-23 PROCEDURE — 83690 ASSAY OF LIPASE: CPT | Performed by: EMERGENCY MEDICINE

## 2025-02-23 PROCEDURE — 80053 COMPREHEN METABOLIC PANEL: CPT | Performed by: EMERGENCY MEDICINE

## 2025-02-23 PROCEDURE — 84484 ASSAY OF TROPONIN QUANT: CPT | Performed by: EMERGENCY MEDICINE

## 2025-02-23 PROCEDURE — 81001 URINALYSIS AUTO W/SCOPE: CPT | Performed by: EMERGENCY MEDICINE

## 2025-02-23 PROCEDURE — 25810000003 LACTATED RINGERS SOLUTION: Performed by: EMERGENCY MEDICINE

## 2025-02-23 PROCEDURE — 25010000002 ONDANSETRON PER 1 MG: Performed by: NURSE PRACTITIONER

## 2025-02-23 PROCEDURE — 25010000002 HYDROMORPHONE PER 4 MG: Performed by: EMERGENCY MEDICINE

## 2025-02-23 PROCEDURE — 76705 ECHO EXAM OF ABDOMEN: CPT | Performed by: EMERGENCY MEDICINE

## 2025-02-23 PROCEDURE — 99285 EMERGENCY DEPT VISIT HI MDM: CPT

## 2025-02-23 PROCEDURE — 25010000002 HYDROMORPHONE PER 4 MG: Performed by: FAMILY MEDICINE

## 2025-02-23 PROCEDURE — 99221 1ST HOSP IP/OBS SF/LOW 40: CPT | Performed by: NURSE PRACTITIONER

## 2025-02-23 RX ORDER — ONDANSETRON 2 MG/ML
4 INJECTION INTRAMUSCULAR; INTRAVENOUS ONCE
Status: COMPLETED | OUTPATIENT
Start: 2025-02-23 | End: 2025-02-23

## 2025-02-23 RX ORDER — SODIUM CHLORIDE 0.9 % (FLUSH) 0.9 %
10 SYRINGE (ML) INJECTION EVERY 12 HOURS SCHEDULED
Status: DISCONTINUED | OUTPATIENT
Start: 2025-02-23 | End: 2025-02-25 | Stop reason: HOSPADM

## 2025-02-23 RX ORDER — SODIUM CHLORIDE 0.9 % (FLUSH) 0.9 %
10 SYRINGE (ML) INJECTION AS NEEDED
Status: DISCONTINUED | OUTPATIENT
Start: 2025-02-23 | End: 2025-02-25 | Stop reason: HOSPADM

## 2025-02-23 RX ORDER — SODIUM CHLORIDE, SODIUM LACTATE, POTASSIUM CHLORIDE, CALCIUM CHLORIDE 600; 310; 30; 20 MG/100ML; MG/100ML; MG/100ML; MG/100ML
100 INJECTION, SOLUTION INTRAVENOUS CONTINUOUS
Status: ACTIVE | OUTPATIENT
Start: 2025-02-23 | End: 2025-02-24

## 2025-02-23 RX ORDER — PROCHLORPERAZINE EDISYLATE 5 MG/ML
5 INJECTION INTRAMUSCULAR; INTRAVENOUS EVERY 6 HOURS PRN
Status: DISCONTINUED | OUTPATIENT
Start: 2025-02-23 | End: 2025-02-25 | Stop reason: HOSPADM

## 2025-02-23 RX ORDER — BISACODYL 5 MG/1
5 TABLET, DELAYED RELEASE ORAL DAILY PRN
Status: DISCONTINUED | OUTPATIENT
Start: 2025-02-23 | End: 2025-02-25 | Stop reason: HOSPADM

## 2025-02-23 RX ORDER — HYDROMORPHONE HYDROCHLORIDE 1 MG/ML
0.5 INJECTION, SOLUTION INTRAMUSCULAR; INTRAVENOUS; SUBCUTANEOUS
Status: DISCONTINUED | OUTPATIENT
Start: 2025-02-23 | End: 2025-02-25 | Stop reason: HOSPADM

## 2025-02-23 RX ORDER — BISACODYL 10 MG
10 SUPPOSITORY, RECTAL RECTAL DAILY PRN
Status: DISCONTINUED | OUTPATIENT
Start: 2025-02-23 | End: 2025-02-25 | Stop reason: HOSPADM

## 2025-02-23 RX ORDER — SODIUM CHLORIDE 9 MG/ML
40 INJECTION, SOLUTION INTRAVENOUS AS NEEDED
Status: DISCONTINUED | OUTPATIENT
Start: 2025-02-23 | End: 2025-02-25 | Stop reason: HOSPADM

## 2025-02-23 RX ORDER — HYDROMORPHONE HYDROCHLORIDE 1 MG/ML
0.5 INJECTION, SOLUTION INTRAMUSCULAR; INTRAVENOUS; SUBCUTANEOUS ONCE
Status: COMPLETED | OUTPATIENT
Start: 2025-02-23 | End: 2025-02-23

## 2025-02-23 RX ORDER — AMOXICILLIN 250 MG
2 CAPSULE ORAL 2 TIMES DAILY PRN
Status: DISCONTINUED | OUTPATIENT
Start: 2025-02-23 | End: 2025-02-25 | Stop reason: HOSPADM

## 2025-02-23 RX ORDER — POLYETHYLENE GLYCOL 3350 17 G/17G
17 POWDER, FOR SOLUTION ORAL DAILY PRN
Status: DISCONTINUED | OUTPATIENT
Start: 2025-02-23 | End: 2025-02-25 | Stop reason: HOSPADM

## 2025-02-23 RX ORDER — NITROGLYCERIN 0.4 MG/1
0.4 TABLET SUBLINGUAL
Status: DISCONTINUED | OUTPATIENT
Start: 2025-02-23 | End: 2025-02-25 | Stop reason: HOSPADM

## 2025-02-23 RX ORDER — ONDANSETRON 2 MG/ML
4 INJECTION INTRAMUSCULAR; INTRAVENOUS EVERY 6 HOURS PRN
Status: DISCONTINUED | OUTPATIENT
Start: 2025-02-23 | End: 2025-02-25 | Stop reason: HOSPADM

## 2025-02-23 RX ADMIN — SODIUM CHLORIDE, SODIUM LACTATE, POTASSIUM CHLORIDE, CALCIUM CHLORIDE 100 ML/HR: 20; 30; 600; 310 INJECTION, SOLUTION INTRAVENOUS at 20:27

## 2025-02-23 RX ADMIN — HYDROMORPHONE HYDROCHLORIDE 0.5 MG: 1 INJECTION, SOLUTION INTRAMUSCULAR; INTRAVENOUS; SUBCUTANEOUS at 21:22

## 2025-02-23 RX ADMIN — ONDANSETRON 4 MG: 2 INJECTION INTRAMUSCULAR; INTRAVENOUS at 17:09

## 2025-02-23 RX ADMIN — ONDANSETRON 4 MG: 2 INJECTION INTRAMUSCULAR; INTRAVENOUS at 20:27

## 2025-02-23 RX ADMIN — SODIUM CHLORIDE, SODIUM LACTATE, POTASSIUM CHLORIDE, CALCIUM CHLORIDE 1000 ML: 20; 30; 600; 310 INJECTION, SOLUTION INTRAVENOUS at 18:09

## 2025-02-23 RX ADMIN — PROCHLORPERAZINE EDISYLATE 5 MG: 5 INJECTION INTRAMUSCULAR; INTRAVENOUS at 23:03

## 2025-02-23 RX ADMIN — Medication 10 ML: at 20:27

## 2025-02-23 RX ADMIN — ONDANSETRON 4 MG: 2 INJECTION INTRAMUSCULAR; INTRAVENOUS at 18:30

## 2025-02-23 RX ADMIN — HYDROMORPHONE HYDROCHLORIDE 0.5 MG: 1 INJECTION, SOLUTION INTRAMUSCULAR; INTRAVENOUS; SUBCUTANEOUS at 18:09

## 2025-02-23 RX ADMIN — HYDROMORPHONE HYDROCHLORIDE 0.5 MG: 1 INJECTION, SOLUTION INTRAMUSCULAR; INTRAVENOUS; SUBCUTANEOUS at 23:36

## 2025-02-23 NOTE — Clinical Note
Level of Care: Telemetry [5]   Diagnosis: Pancreatitis [202663]   Admitting Physician: JAMI GABRIEL [[id number]]

## 2025-02-24 LAB
ALBUMIN SERPL-MCNC: 3.5 G/DL (ref 3.5–5.2)
ALBUMIN/GLOB SERPL: 1.2 G/DL
ALP SERPL-CCNC: 62 U/L (ref 39–117)
ALT SERPL W P-5'-P-CCNC: 10 U/L (ref 1–33)
ANION GAP SERPL CALCULATED.3IONS-SCNC: 9 MMOL/L (ref 5–15)
AST SERPL-CCNC: 31 U/L (ref 1–32)
BASOPHILS # BLD AUTO: 0.03 10*3/MM3 (ref 0–0.2)
BASOPHILS NFR BLD AUTO: 0.2 % (ref 0–1.5)
BILIRUB SERPL-MCNC: 0.5 MG/DL (ref 0–1.2)
BUN SERPL-MCNC: 9 MG/DL (ref 6–20)
BUN/CREAT SERPL: 17.6 (ref 7–25)
CALCIUM SPEC-SCNC: 8.8 MG/DL (ref 8.6–10.5)
CHLORIDE SERPL-SCNC: 102 MMOL/L (ref 98–107)
CHOLEST SERPL-MCNC: 178 MG/DL (ref 0–200)
CO2 SERPL-SCNC: 27 MMOL/L (ref 22–29)
CREAT SERPL-MCNC: 0.51 MG/DL (ref 0.57–1)
DEPRECATED RDW RBC AUTO: 40.5 FL (ref 37–54)
EGFRCR SERPLBLD CKD-EPI 2021: 121.9 ML/MIN/1.73
EOSINOPHIL # BLD AUTO: 0.01 10*3/MM3 (ref 0–0.4)
EOSINOPHIL NFR BLD AUTO: 0.1 % (ref 0.3–6.2)
ERYTHROCYTE [DISTWIDTH] IN BLOOD BY AUTOMATED COUNT: 12.3 % (ref 12.3–15.4)
GLOBULIN UR ELPH-MCNC: 2.9 GM/DL
GLUCOSE SERPL-MCNC: 102 MG/DL (ref 65–99)
HCT VFR BLD AUTO: 40.5 % (ref 34–46.6)
HDLC SERPL-MCNC: 59 MG/DL (ref 40–60)
HGB BLD-MCNC: 13.4 G/DL (ref 12–15.9)
IMM GRANULOCYTES # BLD AUTO: 0.08 10*3/MM3 (ref 0–0.05)
IMM GRANULOCYTES NFR BLD AUTO: 0.6 % (ref 0–0.5)
LDLC SERPL CALC-MCNC: 108 MG/DL (ref 0–100)
LDLC/HDLC SERPL: 1.83 {RATIO}
LYMPHOCYTES # BLD AUTO: 1.56 10*3/MM3 (ref 0.7–3.1)
LYMPHOCYTES NFR BLD AUTO: 10.9 % (ref 19.6–45.3)
MCH RBC QN AUTO: 29.6 PG (ref 26.6–33)
MCHC RBC AUTO-ENTMCNC: 33.1 G/DL (ref 31.5–35.7)
MCV RBC AUTO: 89.6 FL (ref 79–97)
MONOCYTES # BLD AUTO: 0.56 10*3/MM3 (ref 0.1–0.9)
MONOCYTES NFR BLD AUTO: 3.9 % (ref 5–12)
NEUTROPHILS NFR BLD AUTO: 12.08 10*3/MM3 (ref 1.7–7)
NEUTROPHILS NFR BLD AUTO: 84.3 % (ref 42.7–76)
NRBC BLD AUTO-RTO: 0 /100 WBC (ref 0–0.2)
PLATELET # BLD AUTO: 317 10*3/MM3 (ref 140–450)
PMV BLD AUTO: 9.8 FL (ref 6–12)
POTASSIUM SERPL-SCNC: 4.4 MMOL/L (ref 3.5–5.2)
PROT SERPL-MCNC: 6.4 G/DL (ref 6–8.5)
RBC # BLD AUTO: 4.52 10*6/MM3 (ref 3.77–5.28)
SODIUM SERPL-SCNC: 138 MMOL/L (ref 136–145)
TRIGL SERPL-MCNC: 54 MG/DL (ref 0–150)
VLDLC SERPL-MCNC: 11 MG/DL (ref 5–40)
WBC NRBC COR # BLD AUTO: 14.32 10*3/MM3 (ref 3.4–10.8)

## 2025-02-24 PROCEDURE — 80061 LIPID PANEL: CPT | Performed by: NURSE PRACTITIONER

## 2025-02-24 PROCEDURE — 99232 SBSQ HOSP IP/OBS MODERATE 35: CPT | Performed by: INTERNAL MEDICINE

## 2025-02-24 PROCEDURE — 25010000002 HYDROMORPHONE PER 4 MG: Performed by: FAMILY MEDICINE

## 2025-02-24 PROCEDURE — 25810000003 LACTATED RINGERS PER 1000 ML

## 2025-02-24 PROCEDURE — 25010000002 PROCHLORPERAZINE 10 MG/2ML SOLUTION: Performed by: INTERNAL MEDICINE

## 2025-02-24 PROCEDURE — 85025 COMPLETE CBC W/AUTO DIFF WBC: CPT | Performed by: NURSE PRACTITIONER

## 2025-02-24 PROCEDURE — 80053 COMPREHEN METABOLIC PANEL: CPT | Performed by: NURSE PRACTITIONER

## 2025-02-24 PROCEDURE — 25010000002 ONDANSETRON PER 1 MG: Performed by: NURSE PRACTITIONER

## 2025-02-24 RX ORDER — SODIUM CHLORIDE, SODIUM LACTATE, POTASSIUM CHLORIDE, CALCIUM CHLORIDE 600; 310; 30; 20 MG/100ML; MG/100ML; MG/100ML; MG/100ML
100 INJECTION, SOLUTION INTRAVENOUS CONTINUOUS
Status: ACTIVE | OUTPATIENT
Start: 2025-02-24 | End: 2025-02-25

## 2025-02-24 RX ADMIN — HYDROMORPHONE HYDROCHLORIDE 0.5 MG: 1 INJECTION, SOLUTION INTRAMUSCULAR; INTRAVENOUS; SUBCUTANEOUS at 20:35

## 2025-02-24 RX ADMIN — SODIUM CHLORIDE, SODIUM LACTATE, POTASSIUM CHLORIDE, CALCIUM CHLORIDE 100 ML/HR: 20; 30; 600; 310 INJECTION, SOLUTION INTRAVENOUS at 21:16

## 2025-02-24 RX ADMIN — Medication 10 ML: at 07:41

## 2025-02-24 RX ADMIN — HYDROMORPHONE HYDROCHLORIDE 0.5 MG: 1 INJECTION, SOLUTION INTRAMUSCULAR; INTRAVENOUS; SUBCUTANEOUS at 18:12

## 2025-02-24 RX ADMIN — HYDROMORPHONE HYDROCHLORIDE 0.5 MG: 1 INJECTION, SOLUTION INTRAMUSCULAR; INTRAVENOUS; SUBCUTANEOUS at 01:25

## 2025-02-24 RX ADMIN — HYDROMORPHONE HYDROCHLORIDE 0.5 MG: 1 INJECTION, SOLUTION INTRAMUSCULAR; INTRAVENOUS; SUBCUTANEOUS at 15:36

## 2025-02-24 RX ADMIN — PROCHLORPERAZINE EDISYLATE 5 MG: 5 INJECTION INTRAMUSCULAR; INTRAVENOUS at 10:39

## 2025-02-24 RX ADMIN — HYDROMORPHONE HYDROCHLORIDE 0.5 MG: 1 INJECTION, SOLUTION INTRAMUSCULAR; INTRAVENOUS; SUBCUTANEOUS at 13:10

## 2025-02-24 RX ADMIN — HYDROMORPHONE HYDROCHLORIDE 0.5 MG: 1 INJECTION, SOLUTION INTRAMUSCULAR; INTRAVENOUS; SUBCUTANEOUS at 10:39

## 2025-02-24 RX ADMIN — Medication 10 ML: at 20:36

## 2025-02-24 RX ADMIN — Medication 10 ML: at 13:10

## 2025-02-24 RX ADMIN — ONDANSETRON 4 MG: 2 INJECTION INTRAMUSCULAR; INTRAVENOUS at 20:35

## 2025-02-24 RX ADMIN — HYDROMORPHONE HYDROCHLORIDE 0.5 MG: 1 INJECTION, SOLUTION INTRAMUSCULAR; INTRAVENOUS; SUBCUTANEOUS at 07:40

## 2025-02-24 RX ADMIN — ONDANSETRON 4 MG: 2 INJECTION INTRAMUSCULAR; INTRAVENOUS at 13:10

## 2025-02-24 RX ADMIN — HYDROMORPHONE HYDROCHLORIDE 0.5 MG: 1 INJECTION, SOLUTION INTRAMUSCULAR; INTRAVENOUS; SUBCUTANEOUS at 05:24

## 2025-02-24 RX ADMIN — ONDANSETRON 4 MG: 2 INJECTION INTRAMUSCULAR; INTRAVENOUS at 03:22

## 2025-02-24 RX ADMIN — PROCHLORPERAZINE EDISYLATE 5 MG: 5 INJECTION INTRAMUSCULAR; INTRAVENOUS at 04:52
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
## 2025-02-24 NOTE — PROGRESS NOTES
[facility]  PROGRESS NOTE    Patient Name: Candace Urrutia  : 1985  MRN: [id number]    Date of Admission: 2025  Primary Care Physician: Jennifer Lobo MD    Subjective   Subjective     CC:  Pancreatitis    HPI:  Patient resting in bed. Still having significant abdominal discomfort, no nausea, but still without appetite and does not feel like eating much this morning.      Objective   Objective     Vital Signs:   Temp:  [97.6 °F (36.4 °C)-98.3 °F (36.8 °C)] 97.6 °F (36.4 °C)  Heart Rate:  [59-92] 65  Resp:  [16-18] 18  BP: (112-129)/(68-89) 120/85     Physical Exam:  Constitutional: No acute distress, awake, alert  HENT: NCAT, mucous membranes moist  Respiratory: Clear to auscultation bilaterally, respiratory effort normal   Cardiovascular: RRR, no murmurs, rubs, or gallops  Gastrointestinal: soft, mildly tender, nondistended  Musculoskeletal: No bilateral ankle edema  Psychiatric: Appropriate affect, cooperative  Neurologic: Oriented x 3, speech clear, no focal deficits  Skin: No rashes      Results Reviewed:  LAB RESULTS:      Lab 25  0515 25  1704   WBC 14.32* 16.00*   HEMOGLOBIN 13.4 15.3   HEMATOCRIT 40.5 46.2   PLATELETS 317 373   NEUTROS ABS 12.08* 14.23*   IMMATURE GRANS (ABS) 0.08* 0.05   LYMPHS ABS 1.56 1.32   MONOS ABS 0.56 0.37   EOS ABS 0.01 0.00   MCV 89.6 89.2   HSTROP T  --  <6         Lab 25  0515 25  1704   SODIUM 138 137   POTASSIUM 4.4 4.1   CHLORIDE 102 100   CO2 27.0 27.0   ANION GAP 9.0 10.0   BUN 9 8   CREATININE 0.51* 0.63   EGFR 121.9 115.9   GLUCOSE 102* 103*   CALCIUM 8.8 9.5         Lab 25  0515 25  1704   TOTAL PROTEIN 6.4 7.7   ALBUMIN 3.5 4.4   GLOBULIN 2.9 3.3   ALT (SGPT) 10 12   AST (SGOT) 31 19   BILIRUBIN 0.5 0.3   ALK PHOS 62 69   LIPASE  --  >3,000*         Lab 25  1704   HSTROP T <6         Lab 25  0515   CHOLESTEROL 178   LDL CHOL 108*   HDL CHOL 59    TRIGLYCERIDES 54             Brief Urine Lab Results  (Last result in the past 365 days)        Color   Clarity   Blood   Leuk Est   Nitrite   Protein   CREAT   Urine HCG        02/23/25 1940 Yellow   Clear   Negative   Negative   Negative   30 mg/dL (1+)                   Microbiology Results Abnormal       None            US Gallbladder    Result Date: 2/23/2025  US GALLBLADDER Date of Exam: 2/23/2025 5:48 PM EST Indication: RUQ pain. Comparison: No comparisons available. Technique: Grayscale and color Doppler ultrasound evaluation of the right upper quadrant was performed. Findings: Visualized pancreas unremarkable Liver normal in echogenicity and texture. No intrahepatic biliary duct dilatation. Hepatopetal flow in the main portal vein. Patent interrogated hepatic veins Gallbladder normal in size and wall thickness. No shadowing stones in the gallbladder lumen. Small amount of gallbladder sludge present. No pericholecystic fluid. Common duct normal at 4 mm Right kidney normal in echogenicity with no hydronephrosis     Impression: Impression: 1. No evidence of cholelithiasis, cholecystitis, or biliary obstruction. There is some sludge present in the gallbladder lumen 2. Normal ultrasound appearance of the liver, right kidney, and visualized pancreas Electronically Signed: iJmenez Scott  2/23/2025 6:18 PM EST  Workstation ID: [id number]    US GB PERFORMED IN ED BY PROVIDER    Result Date: 2/23/2025  Darryl Kimbrough MD     2/23/2025 10:07 PM US GB PERFORMED IN ED BY PROVIDER Date/Time: 2/23/2025 4:10 PM Performed by: Darryl Kimbrough MD Authorized by: Darryl Kimbrough MD  Comments:    This point of care ultrasound study was medically necessary in order to assess for potential cholecystitis.  Images are somewhat equivocal but there appears to be some mild sludge within the gallbladder without evidence of stone.  I do not appreciate any gallbladder wall thickening or pericholecystic fluid.          Current medications:  Scheduled Meds:sodium chloride, 10 mL, Intravenous, Q12H      Continuous Infusions:   PRN Meds:.  senna-docusate sodium **AND** polyethylene glycol **AND** bisacodyl **AND** bisacodyl    HYDROmorphone    nitroglycerin    ondansetron    prochlorperazine    sodium chloride    sodium chloride    sodium chloride    Assessment & Plan   Assessment & Plan     Active Hospital Problems    Diagnosis  POA    **Pancreatitis [K85.90]  Yes      Resolved Hospital Problems   No resolved problems to display.        Brief Hospital Course to date:  Candace Urrutia is a 39 y.o. female with history of thyroid nodules and  presents to the ED with abdominal pain.  Described as crampy started this morning and has been constant epigastric and right upper quadrant radiating to back.  Admitted to the hospitalist for further eval and treatment.    Idiopathic Pancreatitis  - 1st episode without clear underlying cause  - continue PRN pain and nausea control  - IVF, NPO w/advance diet as tolerates  - lipids are WNL, no ETOH use, no home medications or supplement use  - GB ultrasound without evidence for gallstones and labs not indicative of obstruction  - consider work-up for autoimmune pancreatitis should she have recurrence    Expected Discharge Location and Transportation: Home when tolerating oral intake  Expected Discharge   Expected Discharge Date: 2025; Expected Discharge Time:      VTE Prophylaxis:  Mechanical VTE prophylaxis orders are present.         AM-PAC 6 Clicks Score (PT): 24 (25 2082)    CODE STATUS:   There are no questions and answers to display.       Rosaura Falcon,   25

## 2025-02-24 NOTE — CASE MANAGEMENT/SOCIAL WORK
Discharge Planning Assessment  [facility]     Patient Name: Candace Urrutia  MRN: [id number]  Today's Date: 2/24/2025    Admit Date: 2/23/2025    Plan: Home with SO   Discharge Needs Assessment       Row Name 02/24/25 0831       Living Environment    People in Home significant other    Name(s) of People in Home Tommie Cabrera (BENTLEY) [phone]    Current Living Arrangements home    Potentially Unsafe Housing Conditions none    In the past 12 months has the electric, gas, oil, or water company threatened to shut off services in your home? No    Primary Care Provided by self    Provides Primary Care For no one    Family Caregiver if Needed significant other    Able to Return to Prior Arrangements yes       Resource/Environmental Concerns    Resource/Environmental Concerns none    Transportation Concerns none       Transportation Needs    In the past 12 months, has lack of transportation kept you from medical appointments or from getting medications? no    In the past 12 months, has lack of transportation kept you from meetings, work, or from getting things needed for daily living? No       Food Insecurity    Within the past 12 months, you worried that your food would run out before you got the money to buy more. Never true    Within the past 12 months, the food you bought just didn't last and you didn't have money to get more. Never true       Transition Planning    Patient/Family Anticipates Transition to home with family    Patient/Family Anticipated Services at Transition none    Transportation Anticipated family or friend will provide       Discharge Needs Assessment    Readmission Within the Last 30 Days no previous admission in last 30 days    Equipment Currently Used at Home none    Concerns to be Addressed denies needs/concerns at this time    Do you want help finding or keeping work or a job? I do not need or want help    Do you want help with school or training? For example, starting or completing  job training or getting a high school diploma, GED or equivalent No    Anticipated Changes Related to Illness none    Equipment Needed After Discharge none                   Discharge Plan       Row Name 02/24/25 0832       Plan    Plan Home with SO    Patient/Family in Agreement with Plan yes    Plan Comments Spoke to patient at bedside. Lives with Tommie GREER) [phone] in [facility]. Is independent with ADL's. No problems affording [employer] or medications. Uses [facility]. Uses no DME. PCP is Marvel Lobo MD. Plan is home with B[place]. Family will transport. CM will continue to follow.    Final Discharge Disposition Code 01 - home or self-care                  Continued Care and Services - Admitted Since 2/23/2025    No active coordination exists for this encounter.       Expected Discharge Date and Time       Expected Discharge Date Expected Discharge Time    Feb 25, 2025            Demographic Summary       Row Name 02/24/25 0831       General Information    Admission Type observation    Arrived From emergency department    Referral Source admission list    Reason for Consult discharge planning    Preferred Language English       Contact Information    Permission Granted to Share Info With     Contact Information Obtained for                    Functional Status       Row Name 02/24/25 0831       Functional Status    Usual Activity Tolerance excellent    Current Activity Tolerance good       Physical Activity    On average, how many days per week do you engage in moderate to strenuous exercise (like a brisk walk)? 5 days    On average, how many minutes do you engage in exercise at this level? 30 min    Number of minutes of exercise per week 150       Functional Status, IADL    Medications independent    Meal Preparation independent    Housekeeping independent    Laundry independent    Shopping independent    If for any reason you need help with day-to-day activities such as bathing,  preparing meals, shopping, managing finances, etc., do you get the help you need? I don't need any help       Mental Status    General Appearance WDL WDL       Mental Status Summary    Recent Changes in Mental Status/Cognitive Functioning no changes       Employment/    Employment Status employed full-time                   Psychosocial    No documentation.                  Abuse/Neglect    No documentation.                  Legal    No documentation.                  Substance Abuse    No documentation.                  Patient Forms    No documentation.                     Obie Orourke RN
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
## 2025-02-25 VITALS
OXYGEN SATURATION: 96 % | RESPIRATION RATE: 18 BRPM | BODY MASS INDEX: 27.52 KG/M2 | HEART RATE: 100 BPM | TEMPERATURE: 99.3 F | WEIGHT: 161.2 LBS | SYSTOLIC BLOOD PRESSURE: 114 MMHG | HEIGHT: 64 IN | DIASTOLIC BLOOD PRESSURE: 80 MMHG

## 2025-02-25 PROCEDURE — 25010000002 HYDROMORPHONE PER 4 MG: Performed by: FAMILY MEDICINE

## 2025-02-25 PROCEDURE — 25810000003 LACTATED RINGERS PER 1000 ML

## 2025-02-25 PROCEDURE — 99239 HOSP IP/OBS DSCHRG MGMT >30: CPT | Performed by: INTERNAL MEDICINE

## 2025-02-25 RX ORDER — ONDANSETRON 4 MG/1
4 TABLET, ORALLY DISINTEGRATING ORAL EVERY 8 HOURS PRN
Qty: 10 TABLET | Refills: 0 | Status: SHIPPED | OUTPATIENT
Start: 2025-02-25

## 2025-02-25 RX ADMIN — HYDROMORPHONE HYDROCHLORIDE 0.5 MG: 1 INJECTION, SOLUTION INTRAMUSCULAR; INTRAVENOUS; SUBCUTANEOUS at 00:12

## 2025-02-25 RX ADMIN — SODIUM CHLORIDE, SODIUM LACTATE, POTASSIUM CHLORIDE, CALCIUM CHLORIDE 100 ML/HR: 20; 30; 600; 310 INJECTION, SOLUTION INTRAVENOUS at 06:49

## 2025-02-25 RX ADMIN — HYDROMORPHONE HYDROCHLORIDE 0.5 MG: 1 INJECTION, SOLUTION INTRAMUSCULAR; INTRAVENOUS; SUBCUTANEOUS at 03:42

## 2025-02-25 RX ADMIN — HYDROMORPHONE HYDROCHLORIDE 0.5 MG: 1 INJECTION, SOLUTION INTRAMUSCULAR; INTRAVENOUS; SUBCUTANEOUS at 07:22

## 2025-02-25 NOTE — PAYOR COMM NOTE
"Priti Baez (39 y.o. Female)       Date of Birth   1985    Social Security Number       Address   [address] [place] [address]    Home Phone       MRN   [id number]       Mosque   None    Marital Status   Single                            Admission Date   25    Admission Type   Emergency    Admitting Provider   Rosaura Falcon DO    Attending Provider   Rosaura Falcon DO    Department, Room/Bed   [address], [address]/1       Discharge Date       Discharge Disposition   Home or Self Care    Discharge Destination                                 Attending Provider: Rosaura Falcon DO    Allergies: No Known Allergies    Isolation: None   Infection: None   Code Status: Not on file    Ht: 162.6 cm (64\")   Wt: 73.1 kg (161 lb 3.2 oz)    Admission Cmt: None   Principal Problem: Pancreatitis [K85.90]                   Active Insurance as of 2025       Primary Coverage       Payor Plan Insurance Group Employer/Plan Group    [employer] [id number]       Payor Plan Address Payor Plan Phone Number Payor Plan Fax Number Effective Dates    [address] [phone]  2016 - None Entered    [facility] [id number]         Subscriber Name Subscriber Birth Date Member ID       PRITI BAEZ 1985 [id number]                     Emergency Contacts        (Rel.) Home Phone Work Phone Mobile Phone    TAMIKOCECILYBHUMIKA (Significant Other) -- -- [phone]                 Discharge Summary        Rosaura Falcon DO at 25 1006              [facility] Medicine Services  DISCHARGE SUMMARY    Patient Name: Priti Baez  : 1985  MRN: [id number]    Date of Admission: 2025  5:51 PM  Date of Discharge:  2025  Primary Care Physician: Jennifer Lobo MD    Consults       No orders found from 2025 to 2025.            Hospital Course     Presenting Problem: " Pancreatitis     Active Hospital Problems    Diagnosis  POA    **Pancreatitis [K85.90]  Yes      Resolved Hospital Problems   No resolved problems to display.          Hospital Course:  Candace Urrutia is a 39 y.o. female with history of thyroid nodules and  presents to the ED with abdominal pain, found to have acute pancreatitis.    Idiopathic Pancreatitis  - 1st episode without clear underlying cause  - treated w/supportive care, IVF, PRN pain and nausea control. Now tolerating diet and not requiring any narcotic pain medication.   - lipids are WNL, no ETOH use, no home medications or supplement use  - GB ultrasound without evidence for gallstones and labs not indicative of obstruction  - consider work-up for autoimmune pancreatitis should she have recurrence\    Discharge Follow Up Recommendations for outpatient labs/diagnostics:   - f/u with PCP in 1 week    Day of Discharge     HPI:   Abdominal pain improved, tolerating diet. No nausea or vomiting. Some mild epigastric tenderness remains, but overall feels improved.    Review of Systems  Gen- No fevers, chills  CV- No chest pain, palpitations  Resp- No cough, dyspnea  GI- No N/V/D, +abd pain    Vital Signs:   Temp:  [98 °F (36.7 °C)-99.3 °F (37.4 °C)] 99.3 °F (37.4 °C)  Heart Rate:  [] 100  Resp:  [16-18] 18  BP: (114-123)/(70-80) 114/80      Physical Exam:  Constitutional: No acute distress, awake, alert  HENT: NCAT, mucous membranes moist  Respiratory: Clear to auscultation bilaterally, respiratory effort normal   Cardiovascular: RRR, no murmurs, rubs, or gallops  Gastrointestinal: soft, very mild epigastric tenderness, nondistended  Musculoskeletal: No bilateral ankle edema  Psychiatric: Appropriate affect, cooperative  Neurologic: Oriented x 3, speech clear, no focal deficits  Skin: No rashes    Pertinent  and/or Most Recent Results     LAB RESULTS:      Lab 25  0515 25  1704   WBC 14.32* 16.00*   HEMOGLOBIN 13.4 15.3    HEMATOCRIT 40.5 46.2   PLATELETS 317 373   NEUTROS ABS 12.08* 14.23*   IMMATURE GRANS (ABS) 0.08* 0.05   LYMPHS ABS 1.56 1.32   MONOS ABS 0.56 0.37   EOS ABS 0.01 0.00   MCV 89.6 89.2         Lab 02/24/25  0515 02/23/25  1704   SODIUM 138 137   POTASSIUM 4.4 4.1   CHLORIDE 102 100   CO2 27.0 27.0   ANION GAP 9.0 10.0   BUN 9 8   CREATININE 0.51* 0.63   EGFR 121.9 115.9   GLUCOSE 102* 103*   CALCIUM 8.8 9.5         Lab 02/24/25  0515 02/23/25  1704   TOTAL PROTEIN 6.4 7.7   ALBUMIN 3.5 4.4   GLOBULIN 2.9 3.3   ALT (SGPT) 10 12   AST (SGOT) 31 19   BILIRUBIN 0.5 0.3   ALK PHOS 62 69   LIPASE  --  >3,000*         Lab 02/23/25  1704   HSTROP T <6         Lab 02/24/25  0515   CHOLESTEROL 178   LDL CHOL 108*   HDL CHOL 59   TRIGLYCERIDES 54             Brief Urine Lab Results  (Last result in the past 365 days)        Color   Clarity   Blood   Leuk Est   Nitrite   Protein   CREAT   Urine HCG        02/23/25 1940 Yellow   Clear   Negative   Negative   Negative   30 mg/dL (1+)                 Microbiology Results (last 10 days)       ** No results found for the last 240 hours. **            US Gallbladder    Result Date: 2/23/2025  US GALLBLADDER Date of Exam: 2/23/2025 5:48 PM EST Indication: RUQ pain. Comparison: No comparisons available. Technique: Grayscale and color Doppler ultrasound evaluation of the right upper quadrant was performed. Findings: Visualized pancreas unremarkable Liver normal in echogenicity and texture. No intrahepatic biliary duct dilatation. Hepatopetal flow in the main portal vein. Patent interrogated hepatic veins Gallbladder normal in size and wall thickness. No shadowing stones in the gallbladder lumen. Small amount of gallbladder sludge present. No pericholecystic fluid. Common duct normal at 4 mm Right kidney normal in echogenicity with no hydronephrosis     Impression: 1. No evidence of cholelithiasis, cholecystitis, or biliary obstruction. There is some sludge present in the gallbladder  lumen 2. Normal ultrasound appearance of the liver, right kidney, and visualized pancreas Electronically Signed: Jimenez Scott  2/23/2025 6:18 PM EST  Workstation ID: [id number]    US GB PERFORMED IN ED BY PROVIDER    Result Date: 2/23/2025  Darryl Kimbrough MD     2/23/2025 10:07 PM US GB PERFORMED IN ED BY PROVIDER Date/Time: 2/23/2025 4:10 PM Performed by: Darryl Kimbrough MD Authorized by: Darryl Kimbrough MD  Comments:    This point of care ultrasound study was medically necessary in order to assess for potential cholecystitis.  Images are somewhat equivocal but there appears to be some mild sludge within the gallbladder without evidence of stone.  I do not appreciate any gallbladder wall thickening or pericholecystic fluid.                 Plan for Follow-up of Pending Labs/Results:     Discharge Details        Discharge Medications        New Medications        Instructions Start Date   ondansetron ODT 4 MG disintegrating tablet  Commonly known as: ZOFRAN-ODT   4 mg, Translingual, Every 8 Hours PRN             Continue These Medications        Instructions Start Date   cetirizine 10 MG tablet  Commonly known as: zyrTEC   10 mg, Daily      multivitamin with minerals tablet tablet   1 tablet, Daily      Slynd 4 MG tablet  Generic drug: Drospirenone   4 mg, Daily               No Known Allergies      Discharge Disposition:  Home or Self Care    Diet:  Hospital:  Diet Order   Procedures    Diet: Regular/House, Gastrointestinal; Low Irritant; Fluid Consistency: Thin (IDDSI 0)            Activity:  - as tolerated    Restrictions or Other Recommendations:  - none       CODE STATUS:    There are no questions and answers to display.       No future appointments.              Rosaura Falcon DO  02/25/25      Time Spent on Discharge:  I spent  31  minutes on this discharge activity which included: face-to-face encounter with the patient, reviewing the data in the system, coordination of the care with the  nursing staff as well as consultants, documentation, and entering orders.            Electronically signed by Rosaura Falcon DO at 02/25/25 [id number]

## 2025-02-25 NOTE — PLAN OF CARE
Goal Outcome Evaluation:  Plan of Care Reviewed With: patient        Progress: no change  Outcome Evaluation: VSS, RA; Sinus tach w/ exertion or increases in pain - IVF renewed for overnight; PRNs given for pain control; tolerating clear liquids; will continue POC                     Problem: Adult Inpatient Plan of Care  Goal: Absence of Hospital-Acquired Illness or Injury  Intervention: Identify and Manage Fall Risk  Recent Flowsheet Documentation  Taken 2/25/2025 0400 by Charly Rodney, RN  Safety Promotion/Fall Prevention:   activity supervised   assistive device/personal items within reach   clutter free environment maintained   fall prevention program maintained   nonskid shoes/slippers when out of bed   room organization consistent   safety round/check completed  Taken 2/25/2025 0200 by Charly Rodney RN  Safety Promotion/Fall Prevention:   activity supervised   clutter free environment maintained   assistive device/personal items within reach   fall prevention program maintained   nonskid shoes/slippers when out of bed   safety round/check completed   room organization consistent  Taken 2/25/2025 0000 by Charly Rodney RN  Safety Promotion/Fall Prevention:   activity supervised   assistive device/personal items within reach   clutter free environment maintained   fall prevention program maintained   nonskid shoes/slippers when out of bed   room organization consistent   safety round/check completed  Taken 2/24/2025 2200 by Charly Rodney RN  Safety Promotion/Fall Prevention:   assistive device/personal items within reach   clutter free environment maintained   activity supervised   fall prevention program maintained   nonskid shoes/slippers when out of bed   room organization consistent   safety round/check completed  Taken 2/24/2025 2035 by Charly Rodney RN  Safety Promotion/Fall Prevention:   activity supervised   assistive device/personal items within reach   clutter free  environment maintained   fall prevention program maintained   nonskid shoes/slippers when out of bed   room organization consistent   safety round/check completed

## 2025-02-25 NOTE — PLAN OF CARE
Problem: Adult Inpatient Plan of Care  Goal: Plan of Care Review  Outcome: Met  Goal: Patient-Specific Goal (Individualized)  Outcome: Met  Goal: Absence of Hospital-Acquired Illness or Injury  Outcome: Met  Intervention: Identify and Manage Fall Risk  Recent Flowsheet Documentation  Taken 2/25/2025 1000 by Marilyn Graham RN  Safety Promotion/Fall Prevention:   activity supervised   clutter free environment maintained   assistive device/personal items within reach   fall prevention program maintained   toileting scheduled   safety round/check completed   room organization consistent   nonskid shoes/slippers when out of bed  Taken 2/25/2025 0800 by Marilyn Graham RN  Safety Promotion/Fall Prevention:   activity supervised   assistive device/personal items within reach   clutter free environment maintained   fall prevention program maintained   toileting scheduled   safety round/check completed   room organization consistent   nonskid shoes/slippers when out of bed  Intervention: Prevent Skin Injury  Recent Flowsheet Documentation  Taken 2/25/2025 1000 by Marilyn Graham RN  Body Position: position changed independently  Skin Protection:   incontinence pads utilized   protective footwear used   transparent dressing maintained  Taken 2/25/2025 0800 by Marilyn Graham RN  Body Position: position changed independently  Skin Protection:   transparent dressing maintained   incontinence pads utilized   protective footwear used  Intervention: Prevent and Manage VTE (Venous Thromboembolism) Risk  Recent Flowsheet Documentation  Taken 2/25/2025 0800 by Marilyn Graham RN  VTE Prevention/Management:   bilateral   SCDs (sequential compression devices) off  Intervention: Prevent Infection  Recent Flowsheet Documentation  Taken 2/25/2025 1000 by Marilyn Graham RN  Infection Prevention:   environmental surveillance performed   hand hygiene promoted   rest/sleep promoted   single patient room provided  Taken 2/25/2025  0800 by Marilyn Graham RN  Infection Prevention:   environmental surveillance performed   hand hygiene promoted   rest/sleep promoted   single patient room provided  Goal: Optimal Comfort and Wellbeing  Outcome: Met  Intervention: Provide Person-Centered Care  Recent Flowsheet Documentation  Taken 2/25/2025 0800 by Marilyn Graham RN  Trust Relationship/Rapport:   care explained   choices provided   questions answered   questions encouraged   reassurance provided   thoughts/feelings acknowledged  Goal: Readiness for Transition of Care  Outcome: Met     Problem: Sepsis/Septic Shock  Goal: Optimal Coping  Outcome: Met  Intervention: Support Patient and Family Response  Recent Flowsheet Documentation  Taken 2/25/2025 0800 by Marilyn Graham RN  Supportive Measures: active listening utilized  Family/Support System Care: support provided  Goal: Absence of Bleeding  Outcome: Met  Goal: Blood Glucose Level Within Target Range  Outcome: Met  Goal: Absence of Infection Signs and Symptoms  Outcome: Met  Intervention: Initiate Sepsis Management  Recent Flowsheet Documentation  Taken 2/25/2025 1000 by Marilyn Graham RN  Infection Prevention:   environmental surveillance performed   hand hygiene promoted   rest/sleep promoted   single patient room provided  Taken 2/25/2025 0800 by Marilyn Graham RN  Infection Prevention:   environmental surveillance performed   hand hygiene promoted   rest/sleep promoted   single patient room provided  Intervention: Promote Recovery  Recent Flowsheet Documentation  Taken 2/25/2025 1000 by Marilyn Graham RN  Activity Management: activity encouraged  Taken 2/25/2025 0800 by Marilyn Graham RN  Activity Management: activity encouraged  Sleep/Rest Enhancement: consistent schedule promoted  Goal: Optimal Nutrition Delivery  Outcome: Met   Goal Outcome Evaluation:

## 2025-02-25 NOTE — CASE MANAGEMENT/SOCIAL WORK
Continued Stay Note  [facility]     Patient Name: Candace Urrutia  MRN: [id number]  Today's Date: 2/25/2025    Admit Date: 2/23/2025    Plan: Home with SO   Discharge Plan       Row Name 02/25/25 1007       Plan    Plan Home with SO    Patient/Family in Agreement with Plan yes    Final Discharge Disposition Code 01 - home or self-care    Final Note Plan is home with SO. Family will transport. No discharge needs identified.                   Discharge Codes    No documentation.                 Expected Discharge Date and Time       Expected Discharge Date Expected Discharge Time    Feb 25, 2025               Obie Orourke RN

## 2025-02-25 NOTE — DISCHARGE SUMMARY
[facility] Medicine Services  DISCHARGE SUMMARY    Patient Name: Candace Urrutia  : 1985  MRN: [id number]    Date of Admission: 2025  5:51 PM  Date of Discharge:  2025  Primary Care Physician: Jennifer Lobo MD    Consults       No orders found from 2025 to 2025.            Hospital Course     Presenting Problem: Pancreatitis     Active Hospital Problems    Diagnosis  POA    **Pancreatitis [K85.90]  Yes      Resolved Hospital Problems   No resolved problems to display.          Hospital Course:  Candace Urrutia is a 39 y.o. female with history of thyroid nodules and  presents to the ED with abdominal pain, found to have acute pancreatitis.    Idiopathic Pancreatitis  - 1st episode without clear underlying cause  - treated w/supportive care, IVF, PRN pain and nausea control. Now tolerating diet and not requiring any narcotic pain medication.   - lipids are WNL, no ETOH use, no home medications or supplement use  - GB ultrasound without evidence for gallstones and labs not indicative of obstruction  - consider work-up for autoimmune pancreatitis should she have recurrence\    Discharge Follow Up Recommendations for outpatient labs/diagnostics:   - f/u with PCP in 1 week    Day of Discharge     HPI:   Abdominal pain improved, tolerating diet. No nausea or vomiting. Some mild epigastric tenderness remains, but overall feels improved.    Review of Systems  Gen- No fevers, chills  CV- No chest pain, palpitations  Resp- No cough, dyspnea  GI- No N/V/D, +abd pain    Vital Signs:   Temp:  [98 °F (36.7 °C)-99.3 °F (37.4 °C)] 99.3 °F (37.4 °C)  Heart Rate:  [] 100  Resp:  [16-18] 18  BP: (114-123)/(70-80) 114/80      Physical Exam:  Constitutional: No acute distress, awake, alert  HENT: NCAT, mucous membranes moist  Respiratory: Clear to auscultation bilaterally, respiratory effort normal   Cardiovascular: RRR, no murmurs, rubs, or  gallops  Gastrointestinal: soft, very mild epigastric tenderness, nondistended  Musculoskeletal: No bilateral ankle edema  Psychiatric: Appropriate affect, cooperative  Neurologic: Oriented x 3, speech clear, no focal deficits  Skin: No rashes    Pertinent  and/or Most Recent Results     LAB RESULTS:      Lab 02/24/25  0515 02/23/25  1704   WBC 14.32* 16.00*   HEMOGLOBIN 13.4 15.3   HEMATOCRIT 40.5 46.2   PLATELETS 317 373   NEUTROS ABS 12.08* 14.23*   IMMATURE GRANS (ABS) 0.08* 0.05   LYMPHS ABS 1.56 1.32   MONOS ABS 0.56 0.37   EOS ABS 0.01 0.00   MCV 89.6 89.2         Lab 02/24/25  0515 02/23/25  1704   SODIUM 138 137   POTASSIUM 4.4 4.1   CHLORIDE 102 100   CO2 27.0 27.0   ANION GAP 9.0 10.0   BUN 9 8   CREATININE 0.51* 0.63   EGFR 121.9 115.9   GLUCOSE 102* 103*   CALCIUM 8.8 9.5         Lab 02/24/25  0515 02/23/25  1704   TOTAL PROTEIN 6.4 7.7   ALBUMIN 3.5 4.4   GLOBULIN 2.9 3.3   ALT (SGPT) 10 12   AST (SGOT) 31 19   BILIRUBIN 0.5 0.3   ALK PHOS 62 69   LIPASE  --  >3,000*         Lab 02/23/25  1704   HSTROP T <6         Lab 02/24/25  0515   CHOLESTEROL 178   LDL CHOL 108*   HDL CHOL 59   TRIGLYCERIDES 54             Brief Urine Lab Results  (Last result in the past 365 days)        Color   Clarity   Blood   Leuk Est   Nitrite   Protein   CREAT   Urine HCG        02/23/25 1940 Yellow   Clear   Negative   Negative   Negative   30 mg/dL (1+)                 Microbiology Results (last 10 days)       ** No results found for the last 240 hours. **            US Gallbladder    Result Date: 2/23/2025  US GALLBLADDER Date of Exam: 2/23/2025 5:48 PM EST Indication: RUQ pain. Comparison: No comparisons available. Technique: Grayscale and color Doppler ultrasound evaluation of the right upper quadrant was performed. Findings: Visualized pancreas unremarkable Liver normal in echogenicity and texture. No intrahepatic biliary duct dilatation. Hepatopetal flow in the main portal vein. Patent interrogated hepatic veins  Gallbladder normal in size and wall thickness. No shadowing stones in the gallbladder lumen. Small amount of gallbladder sludge present. No pericholecystic fluid. Common duct normal at 4 mm Right kidney normal in echogenicity with no hydronephrosis     Impression: 1. No evidence of cholelithiasis, cholecystitis, or biliary obstruction. There is some sludge present in the gallbladder lumen 2. Normal ultrasound appearance of the liver, right kidney, and visualized pancreas Electronically Signed: Jimenez Scott  2/23/2025 6:18 PM EST  Workstation ID: [id number]    US GB PERFORMED IN ED BY PROVIDER    Result Date: 2/23/2025  Darryl Kimbrough MD     2/23/2025 10:07 PM US GB PERFORMED IN ED BY PROVIDER Date/Time: 2/23/2025 4:10 PM Performed by: Darryl Kimbrough MD Authorized by: Darryl Kimbrough MD  Comments:    This point of care ultrasound study was medically necessary in order to assess for potential cholecystitis.  Images are somewhat equivocal but there appears to be some mild sludge within the gallbladder without evidence of stone.  I do not appreciate any gallbladder wall thickening or pericholecystic fluid.                 Plan for Follow-up of Pending Labs/Results:     Discharge Details        Discharge Medications        New Medications        Instructions Start Date   ondansetron ODT 4 MG disintegrating tablet  Commonly known as: ZOFRAN-ODT   4 mg, Translingual, Every 8 Hours PRN             Continue These Medications        Instructions Start Date   cetirizine 10 MG tablet  Commonly known as: zyrTEC   10 mg, Daily      multivitamin with minerals tablet tablet   1 tablet, Daily      Slynd 4 MG tablet  Generic drug: Drospirenone   4 mg, Daily               No Known Allergies      Discharge Disposition:  Home or Self Care    Diet:  Hospital:  Diet Order   Procedures    Diet: Regular/House, Gastrointestinal; Low Irritant; Fluid Consistency: Thin (IDDSI 0)            Activity:  - as  tolerated    Restrictions or Other Recommendations:  - none       CODE STATUS:    There are no questions and answers to display.       No future appointments.              Rosaura Falcon DO  02/25/25      Time Spent on Discharge:  I spent  31  minutes on this discharge activity which included: face-to-face encounter with the patient, reviewing the data in the system, coordination of the care with the nursing staff as well as consultants, documentation, and entering orders.

## 2025-02-26 ENCOUNTER — NURSE TRIAGE (OUTPATIENT)
Dept: CALL CENTER | Facility: HOSPITAL | Age: 40
End: 2025-02-26
Payer: COMMERCIAL

## 2025-02-26 NOTE — TELEPHONE ENCOUNTER
"Reason for Disposition   [1] MODERATE-SEVERE SWELLING of abdomen (e.g., looks very distended or swollen) AND [2] NEW-onset or much worse    Additional Information   Negative: Sounds like a life-threatening emergency to the triager   Negative: Chest pain   Negative: Menstrual cramps with bloating are main symptoms   Negative: [1] Abdomen pain is main symptom AND [2] female   Negative: [1] Abdomen pain is main symptom AND [2] male   Negative: Breathing difficulty is main symptom   Negative: Constipation is main symptom   Negative: Diarrhea is main symptom   Negative: Vomiting is main symptom   Negative: [1] MODERATE-SEVERE SWELLING of abdomen (e.g., looks very distended or swollen) AND [2] NEW-onset or much worse AND [3] vomiting   Negative: Blood in bowel movements  (Exception: Blood on surface of BM with constipation.)   Negative: Black or tarry bowel movements  (Exception: Chronic-unchanged black-grey BMs AND is taking iron pills or Pepto-Bismol.)    Answer Assessment - Initial Assessment Questions  1. SYMPTOM: \"What's the main symptom you're concerned about?\" (e.g., abdomen bloating, swelling)      bloated  2. ONSET: \"When did this  start?\"      yesterday  3. SEVERITY: \"How bad is the bloating or swelling?\"     - BLOATING: Feels gassy or bloated. No visible swelling.      - MILD SWELLING: Feels gassy or bloated. Abdomen looks mildly distended or swollen.     - MODERATE - SEVERE SWELLING: Abdomen looks very distended or swollen.       Moderate, stated looks like I'm pregnant  4. ABDOMEN PAIN:  \"Is there any abdomen pain?\" If Yes, ask: \"How bad is the pain?\"  (e.g., Scale 1-10; mild, moderate, or severe)    - NONE (0): No pain.    - MILD (1-3): Doesn't interfere with normal activities, abdomen soft and not tender to touch.     - MODERATE (4-7): Interferes with normal activities or awakens from sleep, abdomen tender to touch.     - SEVERE (8-10): Excruciating pain, doubled over, unable to do any normal activities.  " "      4/10  5. RELIEVING AND AGGRAVATING FACTORS: \"What makes it better or worse?\" (e.g., certain foods, lactose, medicines)      Laying down on back or side  6. GI HISTORY: \"Do you have any history of stomach or intestine problems?\" (e.g., bowel obstruction, cancer, irritable bowel)       Just discharged from hospital with diagnosis of pancreatitis  7. CAUSE: \"What do you think is causing the bloating?\"       Had pancreatitis, discharged from hospital yesterday  8. OTHER SYMPTOMS: \"Do you have any other symptoms?\" (e.g., belching, blood in stool, breathing difficulty, constipation, diarrhea, fever, passing gas, vomiting, weight loss, white of eyes have turned yellow)      Belching and passing gas, nausea, taking miralax   9. PREGNANCY: \"Is there any chance you are pregnant?\" \"When was your last menstrual period?\"      N/a    Protocols used: Abdomen Bloating and Swelling-ADULT-AH    "

## 2025-07-01 ENCOUNTER — TRANSCRIBE ORDERS (OUTPATIENT)
Dept: LAB | Facility: HOSPITAL | Age: 40
End: 2025-07-01
Payer: COMMERCIAL

## 2025-07-01 ENCOUNTER — LAB (OUTPATIENT)
Dept: LAB | Facility: HOSPITAL | Age: 40
End: 2025-07-01
Payer: COMMERCIAL

## 2025-07-01 DIAGNOSIS — O20.9 HEMORRHAGE BEFORE 22 WEEKS GESTATION: Primary | ICD-10-CM

## 2025-07-01 DIAGNOSIS — O20.9 HEMORRHAGE BEFORE 22 WEEKS GESTATION: ICD-10-CM

## 2025-07-01 LAB
HCG INTACT+B SERPL-ACNC: 382 MIU/ML
PROGEST SERPL-MCNC: 11.7 NG/ML

## 2025-07-01 PROCEDURE — 84702 CHORIONIC GONADOTROPIN TEST: CPT

## 2025-07-01 PROCEDURE — 36415 COLL VENOUS BLD VENIPUNCTURE: CPT

## 2025-07-01 PROCEDURE — 84144 ASSAY OF PROGESTERONE: CPT

## 2025-07-03 ENCOUNTER — LAB (OUTPATIENT)
Dept: LAB | Facility: HOSPITAL | Age: 40
End: 2025-07-03
Payer: COMMERCIAL

## 2025-07-03 ENCOUNTER — TRANSCRIBE ORDERS (OUTPATIENT)
Dept: LAB | Facility: HOSPITAL | Age: 40
End: 2025-07-03
Payer: COMMERCIAL

## 2025-07-03 DIAGNOSIS — O20.9 HEMORRHAGE BEFORE 22 WEEKS GESTATION: Primary | ICD-10-CM

## 2025-07-03 DIAGNOSIS — O20.9 HEMORRHAGE BEFORE 22 WEEKS GESTATION: ICD-10-CM

## 2025-07-03 LAB — HCG INTACT+B SERPL-ACNC: 200 MIU/ML

## 2025-07-03 PROCEDURE — 84702 CHORIONIC GONADOTROPIN TEST: CPT

## 2025-07-03 PROCEDURE — 36415 COLL VENOUS BLD VENIPUNCTURE: CPT
